# Patient Record
Sex: FEMALE | Race: OTHER | NOT HISPANIC OR LATINO | URBAN - METROPOLITAN AREA
[De-identification: names, ages, dates, MRNs, and addresses within clinical notes are randomized per-mention and may not be internally consistent; named-entity substitution may affect disease eponyms.]

---

## 2023-03-14 ENCOUNTER — EMERGENCY (EMERGENCY)
Facility: HOSPITAL | Age: 88
LOS: 0 days | Discharge: HOME | End: 2023-03-14
Attending: EMERGENCY MEDICINE
Payer: MEDICARE

## 2023-03-14 VITALS
WEIGHT: 134.92 LBS | HEART RATE: 105 BPM | OXYGEN SATURATION: 95 % | HEIGHT: 60 IN | DIASTOLIC BLOOD PRESSURE: 70 MMHG | TEMPERATURE: 95 F | SYSTOLIC BLOOD PRESSURE: 132 MMHG | RESPIRATION RATE: 18 BRPM

## 2023-03-14 VITALS
HEART RATE: 92 BPM | OXYGEN SATURATION: 96 % | RESPIRATION RATE: 18 BRPM | SYSTOLIC BLOOD PRESSURE: 127 MMHG | TEMPERATURE: 97 F | DIASTOLIC BLOOD PRESSURE: 72 MMHG

## 2023-03-14 DIAGNOSIS — T69.9XXA EFFECT OF REDUCED TEMPERATURE, UNSPECIFIED, INITIAL ENCOUNTER: ICD-10-CM
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
## 2023-03-14 DIAGNOSIS — F03.90 UNSPECIFIED DEMENTIA, UNSPECIFIED SEVERITY, WITHOUT BEHAVIORAL DISTURBANCE, PSYCHOTIC DISTURBANCE, MOOD DISTURBANCE, AND ANXIETY: ICD-10-CM

## 2023-03-14 DIAGNOSIS — E78.5 HYPERLIPIDEMIA, UNSPECIFIED: ICD-10-CM

## 2023-03-14 DIAGNOSIS — N39.0 URINARY TRACT INFECTION, SITE NOT SPECIFIED: ICD-10-CM

## 2023-03-14 LAB
ALBUMIN SERPL ELPH-MCNC: 4.8 G/DL — SIGNIFICANT CHANGE UP (ref 3.5–5.2)
ALP SERPL-CCNC: 73 U/L — SIGNIFICANT CHANGE UP (ref 30–115)
ALT FLD-CCNC: 16 U/L — SIGNIFICANT CHANGE UP (ref 0–41)
ANION GAP SERPL CALC-SCNC: 14 MMOL/L — SIGNIFICANT CHANGE UP (ref 7–14)
APPEARANCE UR: ABNORMAL
APTT BLD: 34.2 SEC — SIGNIFICANT CHANGE UP (ref 27–39.2)
AST SERPL-CCNC: 26 U/L — SIGNIFICANT CHANGE UP (ref 0–41)
BACTERIA # UR AUTO: ABNORMAL /HPF
BASOPHILS # BLD AUTO: 0.05 K/UL — SIGNIFICANT CHANGE UP (ref 0–0.2)
BASOPHILS NFR BLD AUTO: 0.4 % — SIGNIFICANT CHANGE UP (ref 0–1)
BILIRUB SERPL-MCNC: 0.4 MG/DL — SIGNIFICANT CHANGE UP (ref 0.2–1.2)
BILIRUB UR-MCNC: NEGATIVE — SIGNIFICANT CHANGE UP
BUN SERPL-MCNC: 15 MG/DL — SIGNIFICANT CHANGE UP (ref 10–20)
CALCIUM SERPL-MCNC: 9.9 MG/DL — SIGNIFICANT CHANGE UP (ref 8.4–10.5)
CHLORIDE SERPL-SCNC: 98 MMOL/L — SIGNIFICANT CHANGE UP (ref 98–110)
CK SERPL-CCNC: 145 U/L — SIGNIFICANT CHANGE UP (ref 0–225)
CO2 SERPL-SCNC: 24 MMOL/L — SIGNIFICANT CHANGE UP (ref 17–32)
COLOR SPEC: YELLOW — SIGNIFICANT CHANGE UP
CREAT SERPL-MCNC: 0.9 MG/DL — SIGNIFICANT CHANGE UP (ref 0.7–1.5)
DIFF PNL FLD: NEGATIVE — SIGNIFICANT CHANGE UP
EGFR: 59 ML/MIN/1.73M2 — LOW
EOSINOPHIL # BLD AUTO: 0 K/UL — SIGNIFICANT CHANGE UP (ref 0–0.7)
EOSINOPHIL NFR BLD AUTO: 0 % — SIGNIFICANT CHANGE UP (ref 0–8)
EPI CELLS # UR: ABNORMAL /HPF
GLUCOSE SERPL-MCNC: 133 MG/DL — HIGH (ref 70–99)
GLUCOSE UR QL: NEGATIVE MG/DL — SIGNIFICANT CHANGE UP
HCT VFR BLD CALC: 42.6 % — SIGNIFICANT CHANGE UP (ref 37–47)
HGB BLD-MCNC: 13.9 G/DL — SIGNIFICANT CHANGE UP (ref 12–16)
IMM GRANULOCYTES NFR BLD AUTO: 0.4 % — HIGH (ref 0.1–0.3)
INR BLD: 1.02 RATIO — SIGNIFICANT CHANGE UP (ref 0.65–1.3)
KETONES UR-MCNC: ABNORMAL
LEUKOCYTE ESTERASE UR-ACNC: ABNORMAL
LYMPHOCYTES # BLD AUTO: 1.19 K/UL — LOW (ref 1.2–3.4)
LYMPHOCYTES # BLD AUTO: 8.9 % — LOW (ref 20.5–51.1)
MAGNESIUM SERPL-MCNC: 2 MG/DL — SIGNIFICANT CHANGE UP (ref 1.8–2.4)
MCHC RBC-ENTMCNC: 29.3 PG — SIGNIFICANT CHANGE UP (ref 27–31)
MCHC RBC-ENTMCNC: 32.6 G/DL — SIGNIFICANT CHANGE UP (ref 32–37)
MCV RBC AUTO: 89.7 FL — SIGNIFICANT CHANGE UP (ref 81–99)
MONOCYTES # BLD AUTO: 0.55 K/UL — SIGNIFICANT CHANGE UP (ref 0.1–0.6)
MONOCYTES NFR BLD AUTO: 4.1 % — SIGNIFICANT CHANGE UP (ref 1.7–9.3)
NEUTROPHILS # BLD AUTO: 11.51 K/UL — HIGH (ref 1.4–6.5)
NEUTROPHILS NFR BLD AUTO: 86.2 % — HIGH (ref 42.2–75.2)
NITRITE UR-MCNC: NEGATIVE — SIGNIFICANT CHANGE UP
NRBC # BLD: 0 /100 WBCS — SIGNIFICANT CHANGE UP (ref 0–0)
PH UR: 6.5 — SIGNIFICANT CHANGE UP (ref 5–8)
PLATELET # BLD AUTO: 285 K/UL — SIGNIFICANT CHANGE UP (ref 130–400)
POTASSIUM SERPL-MCNC: 4.1 MMOL/L — SIGNIFICANT CHANGE UP (ref 3.5–5)
POTASSIUM SERPL-SCNC: 4.1 MMOL/L — SIGNIFICANT CHANGE UP (ref 3.5–5)
PROT SERPL-MCNC: 7.7 G/DL — SIGNIFICANT CHANGE UP (ref 6–8)
PROT UR-MCNC: 30 MG/DL
PROTHROM AB SERPL-ACNC: 11.6 SEC — SIGNIFICANT CHANGE UP (ref 9.95–12.87)
RBC # BLD: 4.75 M/UL — SIGNIFICANT CHANGE UP (ref 4.2–5.4)
RBC # FLD: 12.5 % — SIGNIFICANT CHANGE UP (ref 11.5–14.5)
RBC CASTS # UR COMP ASSIST: ABNORMAL /HPF
SODIUM SERPL-SCNC: 136 MMOL/L — SIGNIFICANT CHANGE UP (ref 135–146)
SP GR SPEC: 1.02 — SIGNIFICANT CHANGE UP (ref 1.01–1.03)
UROBILINOGEN FLD QL: 0.2 MG/DL — SIGNIFICANT CHANGE UP
WBC # BLD: 13.36 K/UL — HIGH (ref 4.8–10.8)
WBC # FLD AUTO: 13.36 K/UL — HIGH (ref 4.8–10.8)
WBC UR QL: ABNORMAL /HPF

## 2023-03-14 PROCEDURE — 85025 COMPLETE CBC W/AUTO DIFF WBC: CPT

## 2023-03-14 PROCEDURE — 99284 EMERGENCY DEPT VISIT MOD MDM: CPT

## 2023-03-14 PROCEDURE — 96374 THER/PROPH/DIAG INJ IV PUSH: CPT

## 2023-03-14 PROCEDURE — 85730 THROMBOPLASTIN TIME PARTIAL: CPT

## 2023-03-14 PROCEDURE — 81001 URINALYSIS AUTO W/SCOPE: CPT

## 2023-03-14 PROCEDURE — 80053 COMPREHEN METABOLIC PANEL: CPT

## 2023-03-14 PROCEDURE — 85610 PROTHROMBIN TIME: CPT

## 2023-03-14 PROCEDURE — 82550 ASSAY OF CK (CPK): CPT

## 2023-03-14 PROCEDURE — 99284 EMERGENCY DEPT VISIT MOD MDM: CPT | Mod: 25

## 2023-03-14 PROCEDURE — 83735 ASSAY OF MAGNESIUM: CPT

## 2023-03-14 RX ORDER — SODIUM CHLORIDE 9 MG/ML
1000 INJECTION INTRAMUSCULAR; INTRAVENOUS; SUBCUTANEOUS ONCE
Refills: 0 | Status: COMPLETED | OUTPATIENT
Start: 2023-03-14 | End: 2023-03-14

## 2023-03-14 RX ORDER — CEFTRIAXONE 500 MG/1
1000 INJECTION, POWDER, FOR SOLUTION INTRAMUSCULAR; INTRAVENOUS ONCE
Refills: 0 | Status: COMPLETED | OUTPATIENT
Start: 2023-03-14 | End: 2023-03-14

## 2023-03-14 RX ORDER — CEFPODOXIME PROXETIL 100 MG
1 TABLET ORAL
Qty: 14 | Refills: 0
Start: 2023-03-14 | End: 2023-03-20

## 2023-03-14 RX ADMIN — CEFTRIAXONE 100 MILLIGRAM(S): 500 INJECTION, POWDER, FOR SOLUTION INTRAMUSCULAR; INTRAVENOUS at 11:31

## 2023-03-14 RX ADMIN — SODIUM CHLORIDE 1000 MILLILITER(S): 9 INJECTION INTRAMUSCULAR; INTRAVENOUS; SUBCUTANEOUS at 09:36

## 2023-03-14 NOTE — ED PROVIDER NOTE - PHYSICAL EXAMINATION
CONSTITUTIONAL: NAD  SKIN: cold dry, normal skin turgor  HEAD: NCAT  EYES: EOMI, PERRLA, no scleral icterus  ENT: normal pharynx with no erythema or exudates  NECK: Supple; non tender. Full ROM.  CARD: RRR, no murmurs.  RESP: clear to ausculation b/l. No crackles or wheezing.  ABD: soft, non-tender, non-distended, no rebound or guarding.  EXT: Full ROM, no bony tenderness, no pedal edema, no calf tenderness  NEURO: moving all extremities; AAOX1 (baseline)   PSYCH: Cooperative, appropriate.

## 2023-03-14 NOTE — ED PROVIDER NOTE - OBJECTIVE STATEMENT
89 yo F with PMH of dementia baseline AAOX 0-1 at baseline, HLD, TIA BIBEMS after being found outside. Patient lives with son and daughter, left the house around 1 AM and found 1/8 mile from house walking around 7 hours later. Patient does not remember leaving the house. Children say she's never done this before. Patient denies any complaints including headache, vision changes, dizziness, CP, SOB, NVD, dysuria, fever.

## 2023-03-14 NOTE — ED PROVIDER NOTE - NSFOLLOWUPINSTRUCTIONS_ED_ALL_ED_FT
Acute Hypothermia    WHAT YOU NEED TO KNOW:    Hypothermia is a condition that develops when body temperature drops below 95°F (35°C). Acute means the condition starts suddenly, gets worse quickly, and lasts a short time. Hypothermia can happen if your body loses too much heat or cannot keep a constant temperature. Hypothermia is classified according to temperature. Mild is 90-95°F (32.2-35°C). Moderate is 82.4-89.9°F (28-32.1°C). Severe is below 82.4°F (28°C).     DISCHARGE INSTRUCTIONS:    Follow up with your healthcare provider as directed: You may need blood tests or other tests to monitor your condition. Write down your questions so you remember to ask them during your visits.     Prevent hypothermia:     Dress in layers. Wear gloves, a warm hat, and thick socks in cold weather. Wear socks and a warm cap when you sleep. Keep an emergency bag with a dry, insulating blanket in your car in case you get lost or injured.      Do not drink alcohol when you are outside in cold weather.       Try to keep your home heated above 64.4°F (18°C). A hot drink at bedtime, hot water bottle, or electric blanket can help keep you warm while you sleep.       Get up and move at least once an hour.       Ask family, friends, or neighbors to check on you in cold weather. Ask your healthcare provider about services that can help if you need shelter, warm clothing or food, or heating assistance.    For support and more information:     [facility]  [facility] [place] [address]  Phone: [phone]  Web Address: [email or web address]      Contact your healthcare provider if:     You are shivering, breathing fast, or your heart is beating faster than usual.      You feel clumsy or confused.       Your hands and feet become pale.       You have questions or concerns about your condition or care.    Return to the emergency department if:     You are breathing more slowly than usual, or your heartbeat is slow and out of rhythm.       Your skin becomes swollen and blue or gray.       Your muscles feel tight and are hard to move.       Urinary Tract Infection    A urinary tract infection (UTI) is an infection of any part of the urinary tract, which includes the kidneys, ureters, bladder, and urethra. Risk factors include ignoring your need to urinate, wiping back to front if female, being an uncircumcised male, and having diabetes or a weak immune system. Symptoms include frequent urination, pain or burning with urination, foul smelling urine, cloudy urine, pain in the lower abdomen, blood in the urine, and fever. If you were prescribed an antibiotic medicine, take it as told by your health care provider. Do not stop taking the antibiotic even if you start to feel better.    SEEK IMMEDIATE MEDICAL CARE IF YOU HAVE ANY OF THE FOLLOWING SYMPTOMS: severe back or abdominal pain, fever, inability to keep fluids or medicine down, dizziness/lightheadedness, or a change in mental status.

## 2023-03-14 NOTE — ED PROVIDER NOTE - PATIENT PORTAL LINK FT
You can access the FollowMyHealth Patient Portal offered by [facility] by registering at the following website: [email or web address]. By joining [employer]’s FollowMyHealth portal, you will also be able to view your health information using other applications (apps) compatible with our system.

## 2023-03-14 NOTE — ED ADULT TRIAGE NOTE - CHIEF COMPLAINT QUOTE
Patient brought in by [facility]. Patient with history of dementia found wandering in street. Patient's daughter states she was seen on a neighborhood camera at 1am. Patient's rectal temp on triage 94.7

## 2023-03-14 NOTE — ED PROVIDER NOTE - CARE PLAN
1 Principal Discharge DX:	Endogenous hypothermia  Secondary Diagnosis:	Acute UTI   Principal Discharge DX:	Exposure to environmental cold, initial encounter  Secondary Diagnosis:	Acute UTI

## 2023-03-14 NOTE — ED PROVIDER NOTE - CLINICAL SUMMARY MEDICAL DECISION MAKING FREE TEXT BOX
89-year-old female relatively healthy other than dementia brought in by EMS after found wandering (outside temperature low 30s), is at her mental status baseline and has no complaints, on exam vitals appreciated, alert and oriented x1 and recognizes daughter ("good" for her), head NC/AT, PERRLA, conjunctive a pink, dry mucous membranes, neck supple, cor regular, lungs CTA, abdomen soft nontender, no CVA tenderness, neurologically nonfocal, patient actively rewarmed with Reginaldo hugger, has a UTI, hypothermia likely from exposure and not sepsis, discussion with family regarding disposition, will discharge home with them on antibiotics.  Family counseled regarding conditions which should prompt return.

## 2023-03-14 NOTE — ED ADULT NURSE NOTE - CHIEF COMPLAINT QUOTE
Patient brought in by [facility]. Patient with history of dementia found wandering in street. Patient's daughter states she was seen on a neighborhood camera at 1am. Patient's rectal temp on triage 94.7

## 2023-06-05 ENCOUNTER — INPATIENT (INPATIENT)
Facility: HOSPITAL | Age: 88
LOS: 7 days | Discharge: NURSING, REHAB OR LONG-TERM CARE | DRG: 312 | End: 2023-06-13
Attending: INTERNAL MEDICINE | Admitting: INTERNAL MEDICINE
Payer: MEDICARE

## 2023-06-05 VITALS
SYSTOLIC BLOOD PRESSURE: 135 MMHG | DIASTOLIC BLOOD PRESSURE: 72 MMHG | OXYGEN SATURATION: 96 % | RESPIRATION RATE: 16 BRPM | TEMPERATURE: 99 F | WEIGHT: 134.92 LBS | HEART RATE: 79 BPM

## 2023-06-05 DIAGNOSIS — R55 SYNCOPE AND COLLAPSE: ICD-10-CM

## 2023-06-05 LAB
ALBUMIN SERPL ELPH-MCNC: 4.4 G/DL — SIGNIFICANT CHANGE UP (ref 3.5–5.2)
ALP SERPL-CCNC: 69 U/L — SIGNIFICANT CHANGE UP (ref 30–115)
ALT FLD-CCNC: 15 U/L — SIGNIFICANT CHANGE UP (ref 0–41)
ANION GAP SERPL CALC-SCNC: 14 MMOL/L — SIGNIFICANT CHANGE UP (ref 7–14)
AST SERPL-CCNC: 24 U/L — SIGNIFICANT CHANGE UP (ref 0–41)
BASOPHILS # BLD AUTO: 0.05 K/UL — SIGNIFICANT CHANGE UP (ref 0–0.2)
BASOPHILS NFR BLD AUTO: 0.7 % — SIGNIFICANT CHANGE UP (ref 0–1)
BILIRUB SERPL-MCNC: 0.2 MG/DL — SIGNIFICANT CHANGE UP (ref 0.2–1.2)
BUN SERPL-MCNC: 9 MG/DL — LOW (ref 10–20)
CALCIUM SERPL-MCNC: 9.4 MG/DL — SIGNIFICANT CHANGE UP (ref 8.4–10.5)
CHLORIDE SERPL-SCNC: 98 MMOL/L — SIGNIFICANT CHANGE UP (ref 98–110)
CO2 SERPL-SCNC: 25 MMOL/L — SIGNIFICANT CHANGE UP (ref 17–32)
CREAT SERPL-MCNC: 0.7 MG/DL — SIGNIFICANT CHANGE UP (ref 0.7–1.5)
EGFR: 80 ML/MIN/1.73M2 — SIGNIFICANT CHANGE UP
EOSINOPHIL # BLD AUTO: 0.17 K/UL — SIGNIFICANT CHANGE UP (ref 0–0.7)
EOSINOPHIL NFR BLD AUTO: 2.3 % — SIGNIFICANT CHANGE UP (ref 0–8)
GLUCOSE SERPL-MCNC: 96 MG/DL — SIGNIFICANT CHANGE UP (ref 70–99)
HCT VFR BLD CALC: 40.5 % — SIGNIFICANT CHANGE UP (ref 37–47)
HGB BLD-MCNC: 13.4 G/DL — SIGNIFICANT CHANGE UP (ref 12–16)
IMM GRANULOCYTES NFR BLD AUTO: 0.1 % — SIGNIFICANT CHANGE UP (ref 0.1–0.3)
LYMPHOCYTES # BLD AUTO: 2.56 K/UL — SIGNIFICANT CHANGE UP (ref 1.2–3.4)
LYMPHOCYTES # BLD AUTO: 34.2 % — SIGNIFICANT CHANGE UP (ref 20.5–51.1)
MAGNESIUM SERPL-MCNC: 2 MG/DL — SIGNIFICANT CHANGE UP (ref 1.8–2.4)
MCHC RBC-ENTMCNC: 29.7 PG — SIGNIFICANT CHANGE UP (ref 27–31)
MCHC RBC-ENTMCNC: 33.1 G/DL — SIGNIFICANT CHANGE UP (ref 32–37)
MCV RBC AUTO: 89.8 FL — SIGNIFICANT CHANGE UP (ref 81–99)
MONOCYTES # BLD AUTO: 0.72 K/UL — HIGH (ref 0.1–0.6)
MONOCYTES NFR BLD AUTO: 9.6 % — HIGH (ref 1.7–9.3)
NEUTROPHILS # BLD AUTO: 3.98 K/UL — SIGNIFICANT CHANGE UP (ref 1.4–6.5)
NEUTROPHILS NFR BLD AUTO: 53.1 % — SIGNIFICANT CHANGE UP (ref 42.2–75.2)
NRBC # BLD: 0 /100 WBCS — SIGNIFICANT CHANGE UP (ref 0–0)
NT-PROBNP SERPL-SCNC: 55 PG/ML — SIGNIFICANT CHANGE UP (ref 0–300)
PLATELET # BLD AUTO: 250 K/UL — SIGNIFICANT CHANGE UP (ref 130–400)
PMV BLD: 10.8 FL — HIGH (ref 7.4–10.4)
POTASSIUM SERPL-MCNC: 4.1 MMOL/L — SIGNIFICANT CHANGE UP (ref 3.5–5)
POTASSIUM SERPL-SCNC: 4.1 MMOL/L — SIGNIFICANT CHANGE UP (ref 3.5–5)
PROT SERPL-MCNC: 6.6 G/DL — SIGNIFICANT CHANGE UP (ref 6–8)
RBC # BLD: 4.51 M/UL — SIGNIFICANT CHANGE UP (ref 4.2–5.4)
RBC # FLD: 13.1 % — SIGNIFICANT CHANGE UP (ref 11.5–14.5)
SODIUM SERPL-SCNC: 137 MMOL/L — SIGNIFICANT CHANGE UP (ref 135–146)
TROPONIN T SERPL-MCNC: <0.01 NG/ML — SIGNIFICANT CHANGE UP
WBC # BLD: 7.49 K/UL — SIGNIFICANT CHANGE UP (ref 4.8–10.8)
WBC # FLD AUTO: 7.49 K/UL — SIGNIFICANT CHANGE UP (ref 4.8–10.8)

## 2023-06-05 PROCEDURE — 71045 X-RAY EXAM CHEST 1 VIEW: CPT | Mod: 26

## 2023-06-05 PROCEDURE — 82550 ASSAY OF CK (CPK): CPT

## 2023-06-05 PROCEDURE — 82607 VITAMIN B-12: CPT

## 2023-06-05 PROCEDURE — 36415 COLL VENOUS BLD VENIPUNCTURE: CPT

## 2023-06-05 PROCEDURE — 73502 X-RAY EXAM HIP UNI 2-3 VIEWS: CPT | Mod: LT

## 2023-06-05 PROCEDURE — 85025 COMPLETE CBC W/AUTO DIFF WBC: CPT

## 2023-06-05 PROCEDURE — 97116 GAIT TRAINING THERAPY: CPT | Mod: GP

## 2023-06-05 PROCEDURE — 80061 LIPID PANEL: CPT

## 2023-06-05 PROCEDURE — 99222 1ST HOSP IP/OBS MODERATE 55: CPT

## 2023-06-05 PROCEDURE — 93010 ELECTROCARDIOGRAM REPORT: CPT

## 2023-06-05 PROCEDURE — 84443 ASSAY THYROID STIM HORMONE: CPT

## 2023-06-05 PROCEDURE — 80053 COMPREHEN METABOLIC PANEL: CPT

## 2023-06-05 PROCEDURE — 87086 URINE CULTURE/COLONY COUNT: CPT

## 2023-06-05 PROCEDURE — 97162 PT EVAL MOD COMPLEX 30 MIN: CPT | Mod: GP

## 2023-06-05 PROCEDURE — 83735 ASSAY OF MAGNESIUM: CPT

## 2023-06-05 PROCEDURE — 87635 SARS-COV-2 COVID-19 AMP PRB: CPT

## 2023-06-05 PROCEDURE — 95819 EEG AWAKE AND ASLEEP: CPT

## 2023-06-05 PROCEDURE — 81001 URINALYSIS AUTO W/SCOPE: CPT

## 2023-06-05 PROCEDURE — 97110 THERAPEUTIC EXERCISES: CPT | Mod: GP

## 2023-06-05 PROCEDURE — 99285 EMERGENCY DEPT VISIT HI MDM: CPT

## 2023-06-05 PROCEDURE — 83036 HEMOGLOBIN GLYCOSYLATED A1C: CPT

## 2023-06-05 PROCEDURE — 82962 GLUCOSE BLOOD TEST: CPT

## 2023-06-05 PROCEDURE — 82746 ASSAY OF FOLIC ACID SERUM: CPT

## 2023-06-05 RX ORDER — ESCITALOPRAM OXALATE 10 MG/1
1 TABLET, FILM COATED ORAL
Refills: 0 | DISCHARGE

## 2023-06-05 RX ORDER — ALPRAZOLAM 0.25 MG
1 TABLET ORAL DAILY
Refills: 0 | Status: DISCONTINUED | OUTPATIENT
Start: 2023-06-05 | End: 2023-06-10

## 2023-06-05 RX ORDER — ONDANSETRON 8 MG/1
4 TABLET, FILM COATED ORAL EVERY 8 HOURS
Refills: 0 | Status: DISCONTINUED | OUTPATIENT
Start: 2023-06-05 | End: 2023-06-11

## 2023-06-05 RX ORDER — ACETAMINOPHEN 500 MG
650 TABLET ORAL EVERY 6 HOURS
Refills: 0 | Status: DISCONTINUED | OUTPATIENT
Start: 2023-06-05 | End: 2023-06-13

## 2023-06-05 RX ORDER — ENOXAPARIN SODIUM 100 MG/ML
40 INJECTION SUBCUTANEOUS EVERY 24 HOURS
Refills: 0 | Status: DISCONTINUED | OUTPATIENT
Start: 2023-06-05 | End: 2023-06-13

## 2023-06-05 RX ORDER — ESCITALOPRAM OXALATE 10 MG/1
5 TABLET, FILM COATED ORAL DAILY
Refills: 0 | Status: DISCONTINUED | OUTPATIENT
Start: 2023-06-05 | End: 2023-06-13

## 2023-06-05 NOTE — H&P ADULT - ASSESSMENT
# Brief episodes of LOC  # placement 89 years old F with PMHx of eczema and dementia presents to ED with daughter who is the HCP  reports that she brings pt today for NH placement as it has become too difficult to care for pt at home. She also reports pt has been having brief episodes of LOC over the last few weeks that seemed to be precipitated by agitation. The episodes are described as a slow passing out episodes and usually she recovers after 5-10 minutes and feels better afterwards. Pt is AAO x 1 at baseline      # Brief episodes of LOC rule out cardiac vs orthostatic vs Seizure vs vasovagal   - Continue tele monitoring  - check orthostatic VS  - Check UA  - F/u TSH, Echo   - fall & aspiration precautions  - Unlike seizure will do an EEG and consider neuro if positive  - Check CPK    # Placement  - Family intereseted in [facility]    # Agitation  - c/w lexapro and xanax    #Misc  - DVT Prophylaxis:  Lovenox S/C  - GI Prophylaxis:  Not needed  - Diet: Regular   - Dispo: Tele/ placement  - Code status: DNR DNI   89 years old F with PMHx of eczema and dementia presents to ED with daughter who is the HCP  reports that she brings pt today for NH placement as it has become too difficult to care for pt at home. She also reports pt has been having brief episodes of LOC over the last few weeks that seemed to be precipitated by agitation. The episodes are described as a slow passing out episodes and usually she recovers after 5-10 minutes and feels better afterwards. Pt is AAO x 1 at baseline      # Brief episodes of LOC rule out cardiac vs orthostatic vs Seizure vs vasovagal   - Continue tele monitoring  - check orthostatic VS  - Check UA  - F/u TSH   - fall & aspiration precautions  - Unlike seizure will do an EEG and consider neuro if positive  - Check CPK  - Check Vit B12 and folate    # Placement  - Family intereseted in [facility]  - CM cs    # Agitation  - c/w lexapro and xanax    #Misc  - DVT Prophylaxis:  Lovenox S/C  - GI Prophylaxis:  Not needed  - Diet: Regular   - Dispo: Tele/ placement  - Code status: DNR DNI   89 years old F with PMHx of eczema and dementia presents to ED with daughter who is the HCP  reports that she brings pt today for NH placement as it has become too difficult to care for pt at home. She also reports pt has been having brief episodes of LOC over the last few weeks that seemed to be precipitated by agitation. The episodes are described as brief spells of unresponsiveness without seizure like activities and usually she recovers after 5-10 minutes and feels better afterwards. Pt is AAO x 1 at baseline    # Brief episodes of LOC rule out cardiac vs orthostatic vs Seizure vs vasovagal   - Continue tele monitoring  - check orthostatic VS  - Check UA  - F/u TSH   - fall & aspiration precautions  - Unlike seizure will do an EEG and consider neuro if positive  - Check CPK  - Check Vit B12 and folate    # Placement  - Family intereseted in [facility]  - CM cs    # Agitation  - c/w lexapro and xanax    #Misc  - DVT Prophylaxis:  Lovenox S/C  - GI Prophylaxis:  Not needed  - Diet: Regular   - Dispo: Tele/ placement  - Code status: DNR DNI

## 2023-06-05 NOTE — H&P ADULT - NSHPPHYSICALEXAM_GEN_ALL_CORE
GENERAL: NAD, lying in bed comfortably  NECK: Supple  CHEST/LUNG: Clear to auscultation bilaterally; No wheezing/crackles  HEART: Regular rate and rhythm; No murmurs  ABDOMEN: Bowel sounds present; Soft, Nontender, Nondistended  EXTREMITIES:  + Peripheral Pulses, no edema  NERVOUS SYSTEM:  Alert & Oriented X3, no new focal deficits  SKIN: No rashes or lesions GENERAL: NAD, lying in bed comfortably  NECK: Supple  CHEST/LUNG: Clear to auscultation bilaterally; No wheezing/crackles  HEART: Regular rate and rhythm; No murmurs  ABDOMEN: Bowel sounds present; Soft, Nontender, Nondistended  EXTREMITIES:  + Peripheral Pulses, no edema  NERVOUS SYSTEM:  Alert & Oriented X0, no new focal deficits  SKIN: No rashes or lesions

## 2023-06-05 NOTE — H&P ADULT - NSHPLABSRESULTS_GEN_ALL_CORE
13.4   7.49  )-----------( 250      ( 05 Jun 2023 16:08 )             40.5       06-05    137  |  98  |  9<L>  ----------------------------<  96  4.1   |  25  |  0.7    Ca    9.4      05 Jun 2023 16:08  Mg     2.0     06-05    TPro  6.6  /  Alb  4.4  /  TBili  0.2  /  DBili  x   /  AST  24  /  ALT  15  /  AlkPhos  69  06-05                      Lactate Trend      CARDIAC MARKERS ( 05 Jun 2023 16:08 )  x     / <0.01 ng/mL / x     / x     / x            CAPILLARY BLOOD GLUCOSE

## 2023-06-05 NOTE — ED PROVIDER NOTE - OBJECTIVE STATEMENT
pt presents to ED by daughter who provides h/o 2/2 pts baseline dementia. she is her HCP. reports she brings pt today for NH placement as it has become too difficult to care for pt at home. she also reports pt has been having brief episodes of LOC over the last few weeks that seemed to be precipitated by agitation. otherwise, pt is at baseline without any other concerns from family.

## 2023-06-05 NOTE — H&P ADULT - HISTORY OF PRESENT ILLNESS
89 years old F presents to ED by daughter who provides h/o 2/2 pts baseline dementia. she is her HCP. reports she brings pt today for NH placement as it has become too difficult to care for pt at home. she also reports pt has been having brief episodes of LOC over the last few weeks that seemed to be precipitated by agitation    In the ED pts VS were T(C): 36.7  T(F): 98  HR: 85  BP: 150/75  RR: 16  SpO2: 97%     89 years old F with PMHx of eczema and dementia presents to ED with daughter who is the HCP  reports that she brings pt today for NH placement as it has become too difficult to care for pt at home. She also reports pt has been having brief episodes of LOC over the last few weeks that seemed to be precipitated by agitation. The episodes are described as a slow passing out episodes and usually she recovers after 5-10 minutes and feels better afterwards. Pt is AAO x 1 at baseline    In the ED pts VS were T(C): 36.7  T(F): 98  HR: 85  BP: 150/75  RR: 16  SpO2: 97%    Pt is admitted for possible placement. family is interested in [facility]     89 years old F with PMHx of eczema and dementia presents to ED with daughter who is the HCP  reports that she brings pt today for NH placement as it has become too difficult to care for pt at home. She also reports pt has been having brief episodes of LOC over the last few weeks that seemed to be precipitated by agitation. The episodes are described as brief spells of unresponsiveness without seizure like activities and usually she recovers after 5-10 minutes and feels better afterwards. Pt is AAO x 1 at baseline    In the ED pts VS were T(C): 36.7  T(F): 98  HR: 85  BP: 150/75  RR: 16  SpO2: 97%    Pt is admitted for possible placement. family is interested in [facility]

## 2023-06-05 NOTE — ED PROVIDER NOTE - CLINICAL SUMMARY MEDICAL DECISION MAKING FREE TEXT BOX
This patient presents with symptoms consistent with near syncope vs syncope. Differential diagnosis includes reflexive syncope (vasovagal). Low suspicion for orthostatic syncope given lack of dehydration, no evidence of acute life threatening hemorrhage (stable hgb). Presentation not consistent with seizures given short time course, no postictal state, no seizure activity. Low suspicion for acute neurologic catastrophes to include ICH given lack of trauma, risk factors for bleeding, or stroke given no focal neuro deficits. Low suspicion for vascular catastrophes to include PE, thoracic aortic dissection, AAA rupture. Presentation not consistent with acute life threatening arrhythmia, structural heart disease, electrical conduction abnormalities, or ACS. However, given age, cardiovascular risk factors, history & physical, will workup and admit for further evaluation and management.  .

## 2023-06-05 NOTE — ED PROVIDER NOTE - PHYSICAL EXAMINATION
CONSTITUTIONAL: Well-appearing; well-nourished; in no apparent distress.   NECK: Supple; non-tender; no cervical lymphadenopathy.   CARDIOVASCULAR: Normal S1, S2; no murmurs, rubs, or gallops.   RESPIRATORY: Normal chest excursion with respiration; breath sounds clear and equal bilaterally; no wheezes, rhonchi, or rales.  GI/: Non-distended; non-tender; no palpable organomegaly.   MS: No evidence of trauma or deformity. Normal ROM in all four extremities; non-tender to palpation; distal pulses are normal.   SKIN: ?rash to chest area on L side (one single nonspecific ?ulcerated type lesion) that pts daughter reports is slightly painful to pt, ?zoster; otherwise warm; dry; good turgor  NEURO/PSYCH: A & O x 0; baseling per daughter. mood and manner are appropriate. Grooming and personal hygiene are appropriate.

## 2023-06-05 NOTE — ED PROVIDER NOTE - CARE PLAN
Principal Discharge DX:	Brief loss of consciousness  Assessment and plan of treatment:	lara requesting placemnt  Secondary Diagnosis:	Hospital admission due to social situation   1

## 2023-06-05 NOTE — ED ADULT TRIAGE NOTE - CHIEF COMPLAINT QUOTE
"Slow motion fainting spells when she gets agitated." Last episode this morning. Family is also requesting nursing home placement.

## 2023-06-05 NOTE — ED PROVIDER NOTE - DIFFERENTIAL DIAGNOSIS
I have reviewed and agree with the initial nursing note, except as documented in my note. Differential Diagnosis

## 2023-06-05 NOTE — H&P ADULT - ATTENDING COMMENTS
HPI:  89 years old F with PMHx of eczema and dementia presents to ED with daughter who is the HCP  reports that she brings pt today for NH placement as it has become too difficult to care for pt at home. She also reports pt has been having brief episodes of LOC over the last few weeks that seemed to be precipitated by agitation. The episodes are described as brief spells of unresponsiveness without seizure like activities and usually she recovers after 5-10 minutes and feels better afterwards. Pt is AAO x 1 at baseline    In the ED pts VS were T(C): 36.7  T(F): 98  HR: 85  BP: 150/75  RR: 16  SpO2: 97%    Pt is admitted for possible placement. family is interested in [facility]     (05 Jun 2023 20:35)    REVIEW OF SYSTEMS: see cc/HPI   CONSTITUTIONAL: No weakness, fevers or chills  EYES/ENT: No visual changes;  No vertigo or throat pain   NECK: No pain or stiffness  RESPIRATORY: No cough, wheezing, hemoptysis; No shortness of breath  CARDIOVASCULAR: No chest pain or palpitations  GASTROINTESTINAL: No abdominal or epigastric pain. No nausea, vomiting, or hematemesis; No diarrhea or constipation. No melena or hematochezia.  GENITOURINARY: No dysuria, frequency or hematuria  NEUROLOGICAL: No numbness or weakness  SKIN: No itching, rashes    Physical Exam:   General: WN/WD NAD  Neurology: A&Ox3, nonfocal, follows commands  Eyes: PERRLA/ EOMI  ENT/Neck: Neck supple, trachea midline, No JVD  Respiratory: CTA B/L, No wheezing, rales, rhonchi  CV: Normal rate regular rhythm, S1S2, no murmurs, rubs or gallops  Abdominal: Soft, NT, ND +BS,   Extremities: No edema, + peripheral pulses  Skin: No Rashes, Hematoma, Ecchymosis      A/p HPI:  89 years old F with PMHx of eczema and dementia presents to ED with daughter who is the HCP  reports that she brings pt today for NH placement as it has become too difficult to care for pt at home. She also reports pt has been having brief episodes of LOC over the last few weeks that seemed to be precipitated by agitation. The episodes are described as brief spells of unresponsiveness without seizure like activities and usually she recovers after 5-10 minutes and feels better afterwards. Pt is AAO x 1 at baseline    In the ED pts VS were T(C): 36.7  T(F): 98  HR: 85  BP: 150/75  RR: 16  SpO2: 97%    Pt is admitted for possible placement. family is interested in [facility]     (05 Jun 2023 20:35)    REVIEW OF SYSTEMS: see cc/HPI   CONSTITUTIONAL: No weakness, fevers or chills  EYES/ENT: No visual changes;  No vertigo or throat pain   NECK: No pain or stiffness  RESPIRATORY: No cough, wheezing, hemoptysis; No shortness of breath  CARDIOVASCULAR: No chest pain or palpitations  GASTROINTESTINAL: No abdominal or epigastric pain. No nausea, vomiting, or hematemesis; No diarrhea or constipation. No melena or hematochezia.  GENITOURINARY: No dysuria, frequency or hematuria  NEUROLOGICAL: No numbness or weakness  SKIN: No itching, rashes  ENDO: no hyperglycemia / thyroid d/o   MSK: no fracture / deformity   PSYCHE : No psychosis, (+) agitation at times     Physical Exam:   General: WN/WD NAD  Neurology: A&Ox1, nonfocal, follows commands  Eyes: PERRLA/ EOMI  ENT/Neck: Neck supple, trachea midline, No JVD  Respiratory: CTA B/L, No wheezing, rales, rhonchi  CV: Normal rate regular rhythm, S1S2, no murmurs, rubs or gallops  Abdominal: Soft, NT, ND +BS,   Extremities: No edema, + peripheral pulses  Skin: No Rashes, Hematoma, Ecchymosis      A/p  Recurrent episodes for LOC when agitated - unsure of this represents breath holding w/ vasovagal syncope ( patient wake up alert and No post-ictal period described)  -admit to tele overnight r/o arrhythmia   -check blood, urine culture   -screening TSH, Vit B12, Folate, RPR   -2D echo   -check orthostatic vital     Dementia w/ behavioral disturbance   -c/w Lexapro but avoid oversedation w/ BDZ    Lack of social support   -Case management /      Fall precautions / OOB only w/ assistance     DVT prophylaxis      DNR/DNI    PATIENT SEEN by ATTENDING 6/5/23 ( note revised 6/6)

## 2023-06-05 NOTE — ED PROVIDER NOTE - ATTENDING APP SHARED VISIT CONTRIBUTION OF CARE
89-year-old female history of HLD, dementia, PSH significant for SYMONE, non-smoker, denies daily interventions, lives at home with family, now presents with intermittent episodes of agitation and associated near syncope, the family is finding it difficult to care for the patient at home, denies fever, total loss of consciousness, witnessed seizure activity, clumsiness, difficulty with coordination, focal weakness or neurological deficits, new medication changes, change in caffeine, nicotine or alcohol intake, recent trauma or other associated complaints at present. Old chart reviewed. I have reviewed and agree with the initial nursing note, except as documented in my note.    VSS, awake, alert, non-toxic appearing, PERRL / EOMI, oropharynx clear, mmm, no skin rash or lesions, chest CTAB, non-labored breathing, no w/r/r, +S1/S2, RRR, no m/r/g, abdomen soft, NT, ND, +BS, no peripheral edema or deformities, equal pulses upper and lower extremities, baseline mental status, no obvious focal neuro deficits.

## 2023-06-06 LAB
A1C WITH ESTIMATED AVERAGE GLUCOSE RESULT: 5.8 % — HIGH (ref 4–5.6)
ALBUMIN SERPL ELPH-MCNC: 4 G/DL — SIGNIFICANT CHANGE UP (ref 3.5–5.2)
ALP SERPL-CCNC: 64 U/L — SIGNIFICANT CHANGE UP (ref 30–115)
ALT FLD-CCNC: 13 U/L — SIGNIFICANT CHANGE UP (ref 0–41)
ANION GAP SERPL CALC-SCNC: 13 MMOL/L — SIGNIFICANT CHANGE UP (ref 7–14)
AST SERPL-CCNC: 21 U/L — SIGNIFICANT CHANGE UP (ref 0–41)
BASOPHILS # BLD AUTO: 0.05 K/UL — SIGNIFICANT CHANGE UP (ref 0–0.2)
BASOPHILS NFR BLD AUTO: 0.7 % — SIGNIFICANT CHANGE UP (ref 0–1)
BILIRUB SERPL-MCNC: 0.4 MG/DL — SIGNIFICANT CHANGE UP (ref 0.2–1.2)
BUN SERPL-MCNC: 7 MG/DL — LOW (ref 10–20)
CALCIUM SERPL-MCNC: 9.3 MG/DL — SIGNIFICANT CHANGE UP (ref 8.4–10.4)
CHLORIDE SERPL-SCNC: 102 MMOL/L — SIGNIFICANT CHANGE UP (ref 98–110)
CHOLEST SERPL-MCNC: 266 MG/DL — HIGH
CK SERPL-CCNC: 54 U/L — SIGNIFICANT CHANGE UP (ref 0–225)
CO2 SERPL-SCNC: 22 MMOL/L — SIGNIFICANT CHANGE UP (ref 17–32)
CREAT SERPL-MCNC: 0.6 MG/DL — LOW (ref 0.7–1.5)
EGFR: 83 ML/MIN/1.73M2 — SIGNIFICANT CHANGE UP
EOSINOPHIL # BLD AUTO: 0.22 K/UL — SIGNIFICANT CHANGE UP (ref 0–0.7)
EOSINOPHIL NFR BLD AUTO: 3 % — SIGNIFICANT CHANGE UP (ref 0–8)
ESTIMATED AVERAGE GLUCOSE: 120 MG/DL — HIGH (ref 68–114)
FOLATE SERPL-MCNC: >20 NG/ML — SIGNIFICANT CHANGE UP
GLUCOSE SERPL-MCNC: 97 MG/DL — SIGNIFICANT CHANGE UP (ref 70–99)
HCT VFR BLD CALC: 40 % — SIGNIFICANT CHANGE UP (ref 37–47)
HDLC SERPL-MCNC: 42 MG/DL — LOW
HGB BLD-MCNC: 13 G/DL — SIGNIFICANT CHANGE UP (ref 12–16)
IMM GRANULOCYTES NFR BLD AUTO: 0.3 % — SIGNIFICANT CHANGE UP (ref 0.1–0.3)
LIPID PNL WITH DIRECT LDL SERPL: 175 MG/DL — HIGH
LYMPHOCYTES # BLD AUTO: 2.19 K/UL — SIGNIFICANT CHANGE UP (ref 1.2–3.4)
LYMPHOCYTES # BLD AUTO: 29.4 % — SIGNIFICANT CHANGE UP (ref 20.5–51.1)
MAGNESIUM SERPL-MCNC: 1.8 MG/DL — SIGNIFICANT CHANGE UP (ref 1.8–2.4)
MCHC RBC-ENTMCNC: 28.8 PG — SIGNIFICANT CHANGE UP (ref 27–31)
MCHC RBC-ENTMCNC: 32.5 G/DL — SIGNIFICANT CHANGE UP (ref 32–37)
MCV RBC AUTO: 88.7 FL — SIGNIFICANT CHANGE UP (ref 81–99)
MONOCYTES # BLD AUTO: 0.61 K/UL — HIGH (ref 0.1–0.6)
MONOCYTES NFR BLD AUTO: 8.2 % — SIGNIFICANT CHANGE UP (ref 1.7–9.3)
NEUTROPHILS # BLD AUTO: 4.35 K/UL — SIGNIFICANT CHANGE UP (ref 1.4–6.5)
NEUTROPHILS NFR BLD AUTO: 58.4 % — SIGNIFICANT CHANGE UP (ref 42.2–75.2)
NON HDL CHOLESTEROL: 224 MG/DL — HIGH
NRBC # BLD: 0 /100 WBCS — SIGNIFICANT CHANGE UP (ref 0–0)
PLATELET # BLD AUTO: 228 K/UL — SIGNIFICANT CHANGE UP (ref 130–400)
PMV BLD: 10.7 FL — HIGH (ref 7.4–10.4)
POTASSIUM SERPL-MCNC: 3.7 MMOL/L — SIGNIFICANT CHANGE UP (ref 3.5–5)
POTASSIUM SERPL-SCNC: 3.7 MMOL/L — SIGNIFICANT CHANGE UP (ref 3.5–5)
PROT SERPL-MCNC: 6.2 G/DL — SIGNIFICANT CHANGE UP (ref 6–8)
RBC # BLD: 4.51 M/UL — SIGNIFICANT CHANGE UP (ref 4.2–5.4)
RBC # FLD: 12.9 % — SIGNIFICANT CHANGE UP (ref 11.5–14.5)
SODIUM SERPL-SCNC: 137 MMOL/L — SIGNIFICANT CHANGE UP (ref 135–146)
TRIGL SERPL-MCNC: 243 MG/DL — HIGH
TSH SERPL-MCNC: 3.52 UIU/ML — SIGNIFICANT CHANGE UP (ref 0.27–4.2)
VIT B12 SERPL-MCNC: 675 PG/ML — SIGNIFICANT CHANGE UP (ref 232–1245)
WBC # BLD: 7.44 K/UL — SIGNIFICANT CHANGE UP (ref 4.8–10.8)
WBC # FLD AUTO: 7.44 K/UL — SIGNIFICANT CHANGE UP (ref 4.8–10.8)

## 2023-06-06 PROCEDURE — 99232 SBSQ HOSP IP/OBS MODERATE 35: CPT

## 2023-06-06 RX ADMIN — Medication 1 MILLIGRAM(S): at 11:13

## 2023-06-06 RX ADMIN — ENOXAPARIN SODIUM 40 MILLIGRAM(S): 100 INJECTION SUBCUTANEOUS at 11:15

## 2023-06-06 RX ADMIN — Medication 1 APPLICATION(S): at 18:40

## 2023-06-06 RX ADMIN — ESCITALOPRAM OXALATE 5 MILLIGRAM(S): 10 TABLET, FILM COATED ORAL at 11:13

## 2023-06-06 NOTE — PROGRESS NOTE ADULT - ASSESSMENT
89 years old F with PMHx of eczema and dementia presents to ED with daughter who is the HCP  reports that she brings pt today for NH placement as it has become too difficult to care for pt at home. She also reports pt has been having brief episodes of LOC over the last few weeks that seemed to be precipitated by agitation. The episodes are described as brief spells of unresponsiveness without seizure like activities and usually she recovers after 5-10 minutes and feels better afterwards. Pt is AAO x 1 at baseline    # Brief episodes of LOC   - rule out cardiac vs orthostatic vs Seizure vs vasovagal   - Continue tele monitoring  - check orthostatic VS  - Check UA  - F/u TSH   - fall & aspiration precautions  - Unlike seizure will do an EEG and consider neuro if positive  - Check CPK  - Check Vit B12 and folate    # Placement  - Family intereseted in [facility]  - CM cs    # Agitation  - c/w lexapro and xanax    #Misc  - DVT Prophylaxis:  Lovenox S/C  - GI Prophylaxis:  Not needed  - Diet: Regular   - Dispo: Tele/ placement  - Code status: DNR DNI

## 2023-06-06 NOTE — ED ADULT NURSE REASSESSMENT NOTE - NS ED NURSE REASSESS COMMENT FT1
Pt refusing to leave cardiac leads on chest. I explained the importance of them, however pt has dementia and sun-downing and is not understanding. Leads off for now. Will try in morning to put them on again.

## 2023-06-06 NOTE — PROGRESS NOTE ADULT - SUBJECTIVE AND OBJECTIVE BOX
MARCOS TAYLOR  89y  Female      Patient is a 89y old  Female who presents with a chief complaint of Placement (05 Jun 2023 20:35)      INTERVAL HPI/OVERNIGHT EVENTS:      REVIEW OF SYSTEMS:  CONSTITUTIONAL: No fever, weight loss, or fatigue  RESPIRATORY: No cough, wheezing, chills or hemoptysis; No shortness of breath  CARDIOVASCULAR: No chest pain, palpitations, dizziness, or leg swelling  GASTROINTESTINAL: No abdominal or epigastric pain. No nausea, vomiting, or hematemesis; No diarrhea or constipation. No melena or hematochezia.  GENITOURINARY: No dysuria, frequency, hematuria, or incontinence  NEUROLOGICAL: No headaches, memory loss, loss of strength, numbness, or tremors  SKIN: No itching, burning, rashes, or lesions   MUSCULOSKELETAL: No joint pain or swelling; No muscle, back, or extremity pain  PSYCHIATRIC: No depression, anxiety, mood swings, or difficulty sleeping  All other review of systems negative    T(C): 34.4 (06-06-23 @ 08:09), Max: 37 (06-05-23 @ 13:32)  HR: 76 (06-06-23 @ 08:09) (68 - 85)  BP: 144/65 (06-06-23 @ 08:09) (135/72 - 150/75)  RR: 17 (06-06-23 @ 08:09) (16 - 17)  SpO2: 96% (06-06-23 @ 08:09) (96% - 97%)  Wt(kg): --Vital Signs Last 24 Hrs  T(C): 34.4 (06 Jun 2023 08:09), Max: 37 (05 Jun 2023 13:32)  T(F): 93.9 (06 Jun 2023 08:09), Max: 98.6 (05 Jun 2023 13:32)  HR: 76 (06 Jun 2023 08:09) (68 - 85)  BP: 144/65 (06 Jun 2023 08:09) (135/72 - 150/75)  BP(mean): --  RR: 17 (06 Jun 2023 08:09) (16 - 17)  SpO2: 96% (06 Jun 2023 08:09) (96% - 97%)    Parameters below as of 06 Jun 2023 08:09  Patient On (Oxygen Delivery Method): room air      PHYSICAL EXAM:  GENERAL: NAD, well-groomed, well-developed  PSYCH: no agitation, baseline mentation  NERVOUS SYSTEM:  Alert & Oriented X3, Motor Strength 5/5 B/L upper and lower extremities; Sensory intact; FTN WNL  PULMONARY: Clear to percussion bilaterally; No rales, rhonchi, wheezing, or rubs  CARDIOVASCULAR: Regular rate and rhythm; No murmurs, rubs, or gallops  GI: Soft, Nontender, Nondistended; Bowel sounds present  EXTREMITIES:  2+ Peripheral Pulses, No clubbing, cyanosis, or edema  LYMPH: No lymphadenopathy noted  SKIN: No rashes or lesions    Consultant(s) Notes Reviewed:  [x ] YES  [ ] NO    Discussed with Consultants/Other Providers [ x] YES     LABS                          13.0   7.44  )-----------( 228      ( 06 Jun 2023 05:38 )             40.0     06-06    137  |  102  |  7<L>  ----------------------------<  97  3.7   |  22  |  0.6<L>    Ca    9.3      06 Jun 2023 05:38  Mg     1.8     06-06    TPro  6.2  /  Alb  4.0  /  TBili  0.4  /  DBili  x   /  AST  21  /  ALT  13  /  AlkPhos  64  06-06    CARDIAC MARKERS ( 06 Jun 2023 05:38 )  x     / x     / 54 U/L / x     / x      CARDIAC MARKERS ( 05 Jun 2023 16:08 )  x     / <0.01 ng/mL / x     / x     / x        RADIOLOGY & ADDITIONAL TESTS:  Xray Chest 1 View- PORTABLE-Urgent (06.05.23 @ 16:47) >  Impression:  No radiographic evidence of acute cardiopulmonary disease.        Imaging Personally Reviewed:  [ ] YES  [x ] NO    HEALTH ISSUES - PROBLEM Dx:      MEDICATIONS  (STANDING):  ALPRAZolam 1 milliGRAM(s) Oral daily  clobetasol 0.05% Cream 1 Application(s) Topical every 12 hours  enoxaparin Injectable 40 milliGRAM(s) SubCutaneous every 24 hours  escitalopram 5 milliGRAM(s) Oral daily    MEDICATIONS  (PRN):  acetaminophen     Tablet .. 650 milliGRAM(s) Oral every 6 hours PRN Temp greater or equal to 38C (100.4F), Mild Pain (1 - 3)  ondansetron Injectable 4 milliGRAM(s) IV Push every 8 hours PRN Nausea and/or Vomiting     CLAUDIA MARCOS  89y  Female      Patient is a 89y old  Female who presents with a chief complaint of Placement (05 Jun 2023 20:35)      INTERVAL HPI/OVERNIGHT EVENTS: no acute events overnight. daughter at bedside(not daughter that lives with patient and brought her in). noted that mother has been in decline as of lately and at times does not even recognize her own children "much of the time"      REVIEW OF SYSTEMS:  unable to accurately obtain     T(C): 34.4 (06-06-23 @ 08:09), Max: 37 (06-05-23 @ 13:32)  HR: 76 (06-06-23 @ 08:09) (68 - 85)  BP: 144/65 (06-06-23 @ 08:09) (135/72 - 150/75)  RR: 17 (06-06-23 @ 08:09) (16 - 17)  SpO2: 96% (06-06-23 @ 08:09) (96% - 97%)  Wt(kg): --Vital Signs Last 24 Hrs  T(C): 34.4 (06 Jun 2023 08:09), Max: 37 (05 Jun 2023 13:32)  T(F): 93.9 (06 Jun 2023 08:09), Max: 98.6 (05 Jun 2023 13:32)  HR: 76 (06 Jun 2023 08:09) (68 - 85)  BP: 144/65 (06 Jun 2023 08:09) (135/72 - 150/75)  BP(mean): --  RR: 17 (06 Jun 2023 08:09) (16 - 17)  SpO2: 96% (06 Jun 2023 08:09) (96% - 97%)    Parameters below as of 06 Jun 2023 08:09  Patient On (Oxygen Delivery Method): room air      PHYSICAL EXAM:  GENERAL: frail, elderly F, NAD  PSYCH: no agitation, baseline mentation  NERVOUS SYSTEM:  Alert & Oriented X0  PULMONARY: symmetrical chest rise, no accessory muscle use  CARDIOVASCULAR: non tachycardic  GI: Soft, Nontender, Nondistended; Bowel sounds present  EXTREMITIES:   No clubbing, cyanosis, or edema  SKIN: No rashes or lesions    Consultant(s) Notes Reviewed:  [x ] YES  [ ] NO    Discussed with Consultants/Other Providers [ x] YES     LABS                          13.0   7.44  )-----------( 228      ( 06 Jun 2023 05:38 )             40.0     06-06    137  |  102  |  7<L>  ----------------------------<  97  3.7   |  22  |  0.6<L>    Ca    9.3      06 Jun 2023 05:38  Mg     1.8     06-06    TPro  6.2  /  Alb  4.0  /  TBili  0.4  /  DBili  x   /  AST  21  /  ALT  13  /  AlkPhos  64  06-06    CARDIAC MARKERS ( 06 Jun 2023 05:38 )  x     / x     / 54 U/L / x     / x      CARDIAC MARKERS ( 05 Jun 2023 16:08 )  x     / <0.01 ng/mL / x     / x     / x        RADIOLOGY & ADDITIONAL TESTS:  Xray Chest 1 View- PORTABLE-Urgent (06.05.23 @ 16:47) >  Impression:  No radiographic evidence of acute cardiopulmonary disease.        Imaging Personally Reviewed:  [ ] YES  [x ] NO    HEALTH ISSUES - PROBLEM Dx:      MEDICATIONS  (STANDING):  ALPRAZolam 1 milliGRAM(s) Oral daily  clobetasol 0.05% Cream 1 Application(s) Topical every 12 hours  enoxaparin Injectable 40 milliGRAM(s) SubCutaneous every 24 hours  escitalopram 5 milliGRAM(s) Oral daily    MEDICATIONS  (PRN):  acetaminophen     Tablet .. 650 milliGRAM(s) Oral every 6 hours PRN Temp greater or equal to 38C (100.4F), Mild Pain (1 - 3)  ondansetron Injectable 4 milliGRAM(s) IV Push every 8 hours PRN Nausea and/or Vomiting

## 2023-06-07 DIAGNOSIS — Z78.9 OTHER SPECIFIED HEALTH STATUS: ICD-10-CM

## 2023-06-07 DIAGNOSIS — Z79.899 OTHER LONG TERM (CURRENT) DRUG THERAPY: ICD-10-CM

## 2023-06-07 DIAGNOSIS — L30.9 DERMATITIS, UNSPECIFIED: ICD-10-CM

## 2023-06-07 DIAGNOSIS — R55 SYNCOPE AND COLLAPSE: ICD-10-CM

## 2023-06-07 DIAGNOSIS — F03.90 UNSPECIFIED DEMENTIA, UNSPECIFIED SEVERITY, WITHOUT BEHAVIORAL DISTURBANCE, PSYCHOTIC DISTURBANCE, MOOD DISTURBANCE, AND ANXIETY: ICD-10-CM

## 2023-06-07 LAB
APPEARANCE UR: ABNORMAL
BACTERIA # UR AUTO: ABNORMAL
BILIRUB UR-MCNC: NEGATIVE — SIGNIFICANT CHANGE UP
COLOR SPEC: YELLOW — SIGNIFICANT CHANGE UP
DIFF PNL FLD: NEGATIVE — SIGNIFICANT CHANGE UP
EPI CELLS # UR: 13 /HPF — HIGH (ref 0–5)
GLUCOSE BLDC GLUCOMTR-MCNC: 99 MG/DL — SIGNIFICANT CHANGE UP (ref 70–99)
GLUCOSE UR QL: NEGATIVE — SIGNIFICANT CHANGE UP
HYALINE CASTS # UR AUTO: 16 /LPF — HIGH (ref 0–7)
KETONES UR-MCNC: NEGATIVE — SIGNIFICANT CHANGE UP
LEUKOCYTE ESTERASE UR-ACNC: ABNORMAL
NITRITE UR-MCNC: NEGATIVE — SIGNIFICANT CHANGE UP
PH UR: 6.5 — SIGNIFICANT CHANGE UP (ref 5–8)
PROT UR-MCNC: SIGNIFICANT CHANGE UP
RBC CASTS # UR COMP ASSIST: 3 /HPF — SIGNIFICANT CHANGE UP (ref 0–4)
SP GR SPEC: 1.02 — SIGNIFICANT CHANGE UP (ref 1.01–1.03)
UROBILINOGEN FLD QL: SIGNIFICANT CHANGE UP
WBC UR QL: 75 /HPF — HIGH (ref 0–5)

## 2023-06-07 PROCEDURE — 95816 EEG AWAKE AND DROWSY: CPT | Mod: 26

## 2023-06-07 PROCEDURE — 99233 SBSQ HOSP IP/OBS HIGH 50: CPT

## 2023-06-07 PROCEDURE — 73502 X-RAY EXAM HIP UNI 2-3 VIEWS: CPT | Mod: 26,LT

## 2023-06-07 PROCEDURE — 99497 ADVNCD CARE PLAN 30 MIN: CPT | Mod: 25

## 2023-06-07 RX ORDER — CEFPODOXIME PROXETIL 100 MG
100 TABLET ORAL EVERY 12 HOURS
Refills: 0 | Status: DISCONTINUED | OUTPATIENT
Start: 2023-06-07 | End: 2023-06-07

## 2023-06-07 RX ORDER — CEFPODOXIME PROXETIL 100 MG
200 TABLET ORAL EVERY 12 HOURS
Refills: 0 | Status: COMPLETED | OUTPATIENT
Start: 2023-06-07 | End: 2023-06-10

## 2023-06-07 RX ADMIN — Medication 200 MILLIGRAM(S): at 18:19

## 2023-06-07 RX ADMIN — ENOXAPARIN SODIUM 40 MILLIGRAM(S): 100 INJECTION SUBCUTANEOUS at 11:15

## 2023-06-07 RX ADMIN — ESCITALOPRAM OXALATE 5 MILLIGRAM(S): 10 TABLET, FILM COATED ORAL at 11:15

## 2023-06-07 RX ADMIN — Medication 1 MILLIGRAM(S): at 11:15

## 2023-06-07 RX ADMIN — Medication 1 APPLICATION(S): at 07:02

## 2023-06-07 NOTE — RAPID RESPONSE TEAM SUMMARY - NSSITUATIONBACKGROUNDRRT_GEN_ALL_CORE
Pt admitted for syncopal workup, suspected to be vasovagal.  Today, after using the bathroom the patient stood, started to turn around, and fell down without loss of consciousness. Pt's daughter was present and caught the pt who fell on her right side and hit her head. Pt did not sustain any noticeable injuries from the fall.  On exam, pt is at baseline mental state oriented only to self without any focal neurologic deficits.  BP sys 150's, no events on telemetry, glucose >100    Suspect vasovagal vs mechanical fall.    Plan: order CTH/C-spine to rule out bleed

## 2023-06-07 NOTE — PATIENT PROFILE ADULT - FALL HARM RISK - HARM RISK INTERVENTIONS

## 2023-06-07 NOTE — PROGRESS NOTE ADULT - PROBLEM SELECTOR PLAN 1
recurrent episodes after agitation/ periods of high emotion  possible vaso vagal response  reeg neg  ekg noted  d/w daughter, limited w/u requested  discharge planning to snf/ longterm at [place]

## 2023-06-07 NOTE — PROGRESS NOTE ADULT - NSPROGADDITIONALINFOA_GEN_ALL_CORE
#Progress Note Handoff:  Pending (specify):  Consults_________, Tests________, Test Results_______, Other_____snf placement____  Family discussion: d/w daughter, son at bedside re: treatment plan, primary dx  Disposition: Home___/SNF_x__/Other________/Unknown at this time________

## 2023-06-07 NOTE — PROGRESS NOTE ADULT - SUBJECTIVE AND OBJECTIVE BOX
INTERVAL HPI/OVERNIGHT EVENTS:    SUBJECTIVE: Patient seen and examined at bedside.     cc: syncope  no cp, sob, abd pain, fever  no ha, dizziness, lightheadedness, syncope    OBJECTIVE:    VITAL SIGNS:  Vital Signs Last 24 Hrs  T(C): 36.4 (07 Jun 2023 07:00), Max: 36.4 (07 Jun 2023 07:00)  T(F): 97.6 (07 Jun 2023 07:00), Max: 97.6 (07 Jun 2023 07:00)  HR: 83 (07 Jun 2023 07:00) (83 - 97)  BP: 148/74 (07 Jun 2023 07:00) (107/55 - 148/74)  BP(mean): --  RR: 18 (07 Jun 2023 07:00) (18 - 18)  SpO2: 95% (07 Jun 2023 07:00) (94% - 96%)    Parameters below as of 07 Jun 2023 07:00  Patient On (Oxygen Delivery Method): room air          PHYSICAL EXAM:    General: NAD  HEENT: NC/AT; PERRL, clear conjunctiva  Neck: supple  Respiratory: CTA b/l  Cardiovascular: +S1/S2; RRR  Abdomen: soft, NT/ND; +BS x4  Extremities: WWP, 2+ peripheral pulses b/l; no LE edema  Skin: normal color and turgor; no rash  Neurological:    MEDICATIONS:  MEDICATIONS  (STANDING):  ALPRAZolam 1 milliGRAM(s) Oral daily  clobetasol 0.05% Cream 1 Application(s) Topical every 12 hours  enoxaparin Injectable 40 milliGRAM(s) SubCutaneous every 24 hours  escitalopram 5 milliGRAM(s) Oral daily    MEDICATIONS  (PRN):  acetaminophen     Tablet .. 650 milliGRAM(s) Oral every 6 hours PRN Temp greater or equal to 38C (100.4F), Mild Pain (1 - 3)  ondansetron Injectable 4 milliGRAM(s) IV Push every 8 hours PRN Nausea and/or Vomiting      ALLERGIES:  Allergies    No Known Allergies    Intolerances        LABS:                        13.0   7.44  )-----------( 228      ( 06 Jun 2023 05:38 )             40.0     Hemoglobin: 13.0 g/dL (06-06 @ 05:38)  Hemoglobin: 13.4 g/dL (06-05 @ 16:08)    CBC Full  -  ( 06 Jun 2023 05:38 )  WBC Count : 7.44 K/uL  RBC Count : 4.51 M/uL  Hemoglobin : 13.0 g/dL  Hematocrit : 40.0 %  Platelet Count - Automated : 228 K/uL  Mean Cell Volume : 88.7 fL  Mean Cell Hemoglobin : 28.8 pg  Mean Cell Hemoglobin Concentration : 32.5 g/dL  Auto Neutrophil # : 4.35 K/uL  Auto Lymphocyte # : 2.19 K/uL  Auto Monocyte # : 0.61 K/uL  Auto Eosinophil # : 0.22 K/uL  Auto Basophil # : 0.05 K/uL  Auto Neutrophil % : 58.4 %  Auto Lymphocyte % : 29.4 %  Auto Monocyte % : 8.2 %  Auto Eosinophil % : 3.0 %  Auto Basophil % : 0.7 %    06-06    137  |  102  |  7<L>  ----------------------------<  97  3.7   |  22  |  0.6<L>    Ca    9.3      06 Jun 2023 05:38  Mg     1.8     06-06    TPro  6.2  /  Alb  4.0  /  TBili  0.4  /  DBili  x   /  AST  21  /  ALT  13  /  AlkPhos  64  06-06    Creatinine Trend: 0.6<--, 0.7<--  LIVER FUNCTIONS - ( 06 Jun 2023 05:38 )  Alb: 4.0 g/dL / Pro: 6.2 g/dL / ALK PHOS: 64 U/L / ALT: 13 U/L / AST: 21 U/L / GGT: x               hs Troponin:              CSF:                      EKG:   MICROBIOLOGY:    IMAGING:      Labs, imaging, EKG personally reviewed    RADIOLOGY & ADDITIONAL TESTS: Reviewed.

## 2023-06-07 NOTE — PHYSICAL THERAPY INITIAL EVALUATION ADULT - PERTINENT HX OF CURRENT PROBLEM, REHAB EVAL
89 years old F with PMHx of eczema and dementia presents to ED with daughter who is the HCP  reports that she brings pt today for NH placement as it has become too difficult to care for pt at home. She also reports pt has been having brief episodes of LOC over the last few weeks that seemed to be precipitated by agitation. The episodes are described as brief spells of unresponsiveness without seizure like activities and usually she recovers after 5-10 minutes and feels better afterwards. Pt is AAO x 1 at baseline    # Brief episodes of LOC   - rule out cardiac vs orthostatic vs Seizure vs vasovagal

## 2023-06-07 NOTE — PHYSICAL THERAPY INITIAL EVALUATION ADULT - ASSISTIVE DEVICE, REHAB EVAL
Patient admitted with severe PVD of LLE  No Doppler flow identified within posterior tibial, peroneal, or dorsalis pedis arteries consistent with occlusion per [facility] LLE report  Patient was previously on Coumadin for cardiac thrombus as well as atrial fibrillation.  Coumadin was held and she was placed on heparin drip.  On 3/18 underwent left lower extremity angiogram by Dr. Khoobehi, long segment of calcified PT, small vessel disease, lesions were amendable to intervention due to severe calcification/size of vessel.  Discussed these findings with patient and family member present at bedside on day of discharge.   bed rails

## 2023-06-07 NOTE — PROGRESS NOTE ADULT - PROBLEM/PLAN-4
"  CC: Acute Hypoxic Respiratory Failure.    S: Continues to be short of breath. Bronch today.  Continues to have occasional cough and phlegm production.    O: /81 (BP Location: Left arm, Patient Position: Lying)  Pulse 62  Temp 98.5 °F (36.9 °C) (Axillary)   Resp 20  Ht 147.3 cm (58\")  Wt 93 kg (205 lb 1.6 oz)  LMP  (LMP Unknown)  SpO2 94%  BMI 42.87 kg/m2      General: No significant respiratory distress noted.  Neck: No JVD.   Cardiovascular: S1 + S2.  Regular.   Respiratory: Bilateral scattered wheezing heard.  Left greater than right crackles noted  Abdomen: Soft.  Bowel sounds positive.  No obvious organomegaly.  Extremities: Trace  edema noted.  Neurologic:  AAOx3. Was able to follow commands.     Labs: Reviewed.       Results from last 7 days  Lab Units 01/08/18  0607 01/06/18  0637 01/05/18  0603   SODIUM mmol/L 143 141 145   POTASSIUM mmol/L 4.4 4.6 4.2   CHLORIDE mmol/L 97* 93* 93*   CO2 mmol/L 38.0* 40.0* 42.0*   BUN mg/dL 44* 55* 55*   CREATININE mg/dL 1.20 1.60* 1.80*   CALCIUM mg/dL 9.7 9.2 9.2   GLUCOSE mg/dL 281* 386* 223*       Results from last 7 days  Lab Units 01/08/18  0607 01/05/18  0603   WBC 10*3/mm3 15.71* 12.84*   HEMOGLOBIN g/dL 13.4 13.2   PLATELETS 10*3/mm3 370 341         Results from last 7 days  Lab Units 01/08/18  0607 01/05/18  0603   INR  1.10 1.17*         Pharmacy Consult        CXRay: Left asymmetric opacity.     Assessment & Recommendations/Plan:   1.  Acute hypoxic respiratory failure  Continues to require high flow oxygen    2.  Left upper lobe healthcare associated pneumonia  On appropriate antibiotics  Cultures pending    3.  Left upper lobe atelectasis?  Continue percussion therapy as well as incentive spirometry.    4.  Chronic hypoxemia  Is on 2 L at home.  Currently requiring high flow.    5.  Obstructive sleep apnea.    On BiPAP for many years.  Continue BiPAP    6.  Chronic hypercarbic and hypoxic respiratory failure (last ABG from 1 January showed pH of " 7.41 with a PCO2 55 and PO2 of 62)  Is on BiPAP  We may need repeat ABG on outpatient basis, to assess her response and possible adjustments to the BiPAP.    7.  Grade 2 Diastolic Dysfunction.   Doesn't appear to be decompensated    8. Likely CKD.   Baseline Creatinine seems to be 1.3-1.5.    Lovenox will be restarted after Bronchoscopy.     Based on the results of bronchoscopy and her clinical progression, further recommendations will be made.    I will also order ABG for tomorrow morning    We have reviewed patient's current orders and changes, if any, have been suggested to primary care team. Plan was also discussed with nursing staff, as necessary.       Ryder Dahl MD  01/08/18  9:15 AM    Dictated utilizing Dragon dictation.     DISPLAY PLAN FREE TEXT

## 2023-06-07 NOTE — GOALS OF CARE CONVERSATION - ADVANCED CARE PLANNING - CONVERSATION DETAILS
CODE STATUS: DNR/DNI  NG TUBE: DECLINED     Discussed care with daughter Charmaine Fitzgerald ([phone]). Prior MOLST for DNR/DNI incompletely filled. Reviewed through with family, still maintain DNR/DNI, would prefer no NGT, now interested in CMO at this time as pt stable, if she were to become unstable not interested in aggressive measures.

## 2023-06-07 NOTE — PATIENT PROFILE ADULT - PATIENT REPRESENTATIVE: ( YOU CAN CHOOSE ANY PERSON THAT CAN ASSIST YOU WITH YOUR HEALTH CARE PREFERENCES, DOES NOT HAVE TO BE A SPOUSE, IMMEDIATE FAMILY OR SIGNIFICANT OTHER/PARTNER)
same name as above Imiquimod Counseling:  I discussed with the patient the risks of imiquimod including but not limited to erythema, scaling, itching, weeping, crusting, and pain.  Patient understands that the inflammatory response to imiquimod is variable from person to person and was educated regarded proper titration schedule.  If flu-like symptoms develop, patient knows to discontinue the medication and contact us.

## 2023-06-07 NOTE — PHYSICAL THERAPY INITIAL EVALUATION ADULT - IMPAIRMENTS FOUND, PT EVAL
Primary Care Center Phone Number [phone]  [facility] Medication Refill Request Information:  * Please contact your pharmacy regarding ANY request for medication refills.  ** [facility] Prescription Fax = [phone]  * Please allow 3 business days for routine medication refills.  * Please allow 5 business days for controlled substance medication refills.     [facility] Test Result notification information:  *You will be notified with in 7-10 days of your appointment day regarding the results of your test.  If you are on MyChart you will be notified as soon as the provider has reviewed the results and signed off on them.                 [facility]         Internal Medicine Resident                   Continuity Clinic    Who We Are    Resident Continuity Clinic is a part of the [facility] Primary [facility].  Resident physicians see patients independently and establish a relationship with them over the course of their three-year residency program.  As with the Primary Care Clinic, our Resident Continuity Clinic models a group practice.  If your doctor is not available, you will be able to see another resident physician.  At the end of a resident s training, patients will be transitioned to a new resident physician for ongoing care.     We treat patients with a wide array of medical needs from routine physicals, to acute illnesses, to diabetes and blood pressure management, to complex medical illness.  What is a Resident Physician?    Resident physicians hold medical degrees and are doctors. They are training to become specialists in Internal Medicine. They work under the supervision of board-certified faculty physicians.  Expectations for Your Care    We strive to provide accessible, quality care at all times.    In order to provide this care, it is best to see your primary care resident doctor consistently rather switching between providers.  In the event you do see another physician, you  should schedule a follow-up visit with your usual primary care doctor.    If you are transitioning your care from another clinic, it is helpful to have your records available for your doctor to review.    We do not prescribe controlled substances, such as ADD medications or narcotic pain medications, on your first visit.  We will review your health records and concerns prior to devising a treatment plan with you in order to provide the best care.      Clinic Services     Extended clinic hours; patient  to help navigate your visit;  parking; laboratory and imaging services with evening and weekend hours    Multiple medical and surgical specialties in one building    Complementary services, including Nutrition, Integrative Medicine, Pharmacy consultations, Mental and Behavioral Health, Sports Medicine and Physical Therapy    Thank You    We would like to thank you for choosing the [facility] Internal Medicine Resident Continuity Clinic for your primary care. You are making a priceless contribution to the training of the next generation of health care practitioners.     Contact us at [phone] for appointments or questions.    Resident Clinic Hours are Tuesdays and Thursdays, 7:30am-5:00pm    Residents  Grace Paul MD   (Female )   Sunshine Duran MD   (Female)   Mark Mcgill MD  (Male)   Adebayo Hanson MD  (Male)   Blanca Robertson MD   (Female)   Taiwo Michelle MD  (Male)    Prem Lou MD  (Male)   Jean-Paul Ghosh MD  (Male)   Adebayo Delaney MD (Male)   Jese Almanza MD  (Male)   Bree Lozano MD (Female)    Demetria Guevara MD (Female)   Mike Sanchez MD  (Male)   Sara Long MD(Female)   Chiqui Ramsey MD  (Female)    Supervising Physicians   MD Taryn Kirk MD Briar Duffy, MD James Langland, MD Mary Logeais, MD Tanya Melnik, MD Charles Moldow, MD Heather Thompson Buum, MD Kathleen Watson, MD             aerobic capacity/endurance/arousal, attention, and cognition/muscle strength

## 2023-06-08 LAB — SARS-COV-2 RNA SPEC QL NAA+PROBE: SIGNIFICANT CHANGE UP

## 2023-06-08 PROCEDURE — 99231 SBSQ HOSP IP/OBS SF/LOW 25: CPT

## 2023-06-08 RX ADMIN — Medication 1 APPLICATION(S): at 17:30

## 2023-06-08 RX ADMIN — Medication 200 MILLIGRAM(S): at 17:29

## 2023-06-08 RX ADMIN — Medication 1 APPLICATION(S): at 05:13

## 2023-06-08 RX ADMIN — ESCITALOPRAM OXALATE 5 MILLIGRAM(S): 10 TABLET, FILM COATED ORAL at 12:50

## 2023-06-08 RX ADMIN — Medication 1 MILLIGRAM(S): at 21:41

## 2023-06-08 RX ADMIN — ENOXAPARIN SODIUM 40 MILLIGRAM(S): 100 INJECTION SUBCUTANEOUS at 12:50

## 2023-06-08 RX ADMIN — Medication 200 MILLIGRAM(S): at 05:12

## 2023-06-08 NOTE — PROGRESS NOTE ADULT - ASSESSMENT
89 years old F with PMHx of eczema and dementia presents to ED with daughter who is the HCP  reports that she brings pt today for NH placement   as it has become too difficult to care for pt at home. She also reports pt has been having brief episodes of LOC over the last few weeks that seemed to   be precipitated by agitation. The episodes are described as brief spells of unresponsiveness without seizure like activities and usually she recovers after   5-10 minutes and feels better afterwards. Pt is AAO x 1 at baseline    # syncope/ LOC  most likely 2/2 vaso vagal response with functional declining   #recurrent episodes after agitation/ periods of high emotion  reeg neg  ekg wnl   family (daughter) not interested in aggressive measures/ work up  RR on 6/8 for fall, mechanical vs. vasovagal. X-ray hip negative. Pt too agitated for CT exams. D/w daughter and no further imaging w/u needed  discharge planning to snf/ long term at [place].  - dc tele  -pt removed IV because is confused. daughter requesting to not have IV replaced    #Unable to care for self.   ·  Plan: pending placement to [place].    #Dementia.   c/w  lexapro 5  c/w xanax     DVT ppx: lovenox  DNR/DNI  Dispo: pending placement

## 2023-06-08 NOTE — PROGRESS NOTE ADULT - SUBJECTIVE AND OBJECTIVE BOX
LENGTH OF HOSPITAL STAY: 3d    Overnight events: RR yesterday evening for fall  Complaints: hip pain following     CHIEF COMPLAINT:   Patient is a 89y old  Female who presents with a chief complaint of Placement (2023 09:07)        HISTORY OF PRESENTING ILLNESS:    HPI:  89 years old F with PMHx of eczema and dementia presents to ED with daughter who is the HCP  reports that she brings pt today for NH placement as it has become too difficult to care for pt at home. She also reports pt has been having brief episodes of LOC over the last few weeks that seemed to be precipitated by agitation. The episodes are described as brief spells of unresponsiveness without seizure like activities and usually she recovers after 5-10 minutes and feels better afterwards. Pt is AAO x 1 at baseline    In the ED pts VS were T(C): 36.7  T(F): 98  HR: 85  BP: 150/75  RR: 16  SpO2: 97%    Pt is admitted for possible placement. family is interested in [facility]     (2023 20:35)    PAST MEDICAL & SURGICAL HISTORY  PAST MEDICAL & SURGICAL HISTORY:  Dementia      Eczema        SOCIAL HISTORY:    ALLERGIES:  No Known Allergies    MEDICATIONS:  STANDING MEDICATIONS  ALPRAZolam 1 milliGRAM(s) Oral daily  cefpodoxime 200 milliGRAM(s) Oral every 12 hours  clobetasol 0.05% Cream 1 Application(s) Topical every 12 hours  enoxaparin Injectable 40 milliGRAM(s) SubCutaneous every 24 hours  escitalopram 5 milliGRAM(s) Oral daily    PRN MEDICATIONS  acetaminophen     Tablet .. 650 milliGRAM(s) Oral every 6 hours PRN  ondansetron Injectable 4 milliGRAM(s) IV Push every 8 hours PRN    VITALS:   T(F): 96.6  HR: 75  BP: 108/61  RR: 18  SpO2: --    LABS:            Urinalysis Basic - ( 2023 13:27 )    Color: Yellow / Appearance: Slightly Turbid / S.018 / pH: x  Gluc: x / Ketone: Negative  / Bili: Negative / Urobili: <2 mg/dL   Blood: x / Protein: Trace / Nitrite: Negative   Leuk Esterase: Large / RBC: 3 /HPF / WBC 75 /HPF   Sq Epi: x / Non Sq Epi: x / Bacteria: Many                  PHYSICAL EXAM:  GEN: No acute distress  LUNGS: Normal respiratory effort. Clear to auscultation bilaterally. on RA  HEART: S1/S2 present. RRR. no murmurs  ABD: Soft, non-tender, non-distended  EXT: no cyanosis or edema  NEURO: no focal deficits

## 2023-06-08 NOTE — PROGRESS NOTE ADULT - ASSESSMENT
89 years old F with PMHx of eczema and dementia presents to ED with daughter who is the HCP  reports that she brings pt today for NH placement   as it has become too difficult to care for pt at home. She also reports pt has been having brief episodes of LOC over the last few weeks that seemed to   be precipitated by agitation. The episodes are described as brief spells of unresponsiveness without seizure like activities and usually she recovers after   5-10 minutes and feels better afterwards. Pt is AAO x 1 at baseline          # Syncope.   ·  Plan: recurrent episodes after agitation/ periods of high emotion  possible vaso vagal response  reeg neg  ekg wnl   discharge planning to snf/ longterm at [place].    #Unable to care for self.   ·  Plan: pending placement to [place].    #Dementia.   ·  Plan: lexapro 5  xanax 1.    #On deep vein thrombosis (DVT) prophylaxis.   ·  Plan: lovenox.   89 years old F with PMHx of eczema and dementia presents to ED with daughter who is the HCP  reports that she brings pt today for NH placement   as it has become too difficult to care for pt at home. She also reports pt has been having brief episodes of LOC over the last few weeks that seemed to   be precipitated by agitation. The episodes are described as brief spells of unresponsiveness without seizure like activities and usually she recovers after   5-10 minutes and feels better afterwards. Pt is AAO x 1 at baseline          # syncope/ LOC .   ·  recurrent episodes after agitation/ periods of high emotion  possible vaso vagal response  reeg neg  ekg wnl   family not interested in aggressive measures   discharge planning to snf/ long term at [facility].    #Unable to care for self.   ·  Plan: pending placement to [facility].    #Dementia.   ·  Plan: lexapro 5  xanax 1.    #On deep vein thrombosis (DVT) prophylaxis.   ·  Plan: lovenox.    DNR/DNI 89 years old F with PMHx of eczema and dementia presents to ED with daughter who is the HCP  reports that she brings pt today for NH placement   as it has become too difficult to care for pt at home. She also reports pt has been having brief episodes of LOC over the last few weeks that seemed to   be precipitated by agitation. The episodes are described as brief spells of unresponsiveness without seizure like activities and usually she recovers after   5-10 minutes and feels better afterwards. Pt is AAO x 1 at baseline          # syncope/ LOC .   ·  recurrent episodes after agitation/ periods of high emotion  possible vaso vagal response with functional declining   reeg neg  ekg wnl   family (daughter) not interested in aggressive measures/ work up  discharge planning to snf/ long term at [facility].    #Unable to care for self.   ·  Plan: pending placement to [facility].    #Dementia.   ·  Plan: lexapro 5  xanax 1.    #On deep vein thrombosis (DVT) prophylaxis.   ·  Plan: lovenox.    DNR/DNI  pending placement

## 2023-06-08 NOTE — PROGRESS NOTE ADULT - SUBJECTIVE AND OBJECTIVE BOX
Patient is a 89y old  Female who presents with a chief complaint of Placement (2023 09:07)      OVERNIGHT EVENTS: daughter at bedside, per her the pt mental status  is at baseline and she doesn't want aggressive measures     SUBJECTIVE / INTERVAL HPI: Patient seen and examined at bedside.     VITAL SIGNS:  Vital Signs Last 24 Hrs  T(C): 35.9 (2023 13:27), Max: 36.3 (2023 15:35)  T(F): 96.6 (2023 13:27), Max: 97.3 (2023 15:35)  HR: 75 (2023 13:27) (75 - 86)  BP: 108/61 (2023 13:27) (108/61 - 141/65)  BP(mean): 93 (2023 20:14) (89 - 93)  RR: 18 (2023 13:27) (18 - 18)  SpO2: 94% (2023 15:35) (94% - 94%)    Parameters below as of 2023 15:35  Patient On (Oxygen Delivery Method): room air        PHYSICAL EXAM:    General: old thin lady chronically look ill   HEENT: NC/AT; PERRL, clear conjunctiva  Neck: supple  Cardiovascular: +S1/S2; RRR  Respiratory: CTA b/l; no W/R/R  Gastrointestinal: soft, NT/ND; +BSx4  Extremities: WWP; 2+ peripheral pulses; no edema   Neurological: AAOx1; moves all exts, no focal deficits    MEDICATIONS:  MEDICATIONS  (STANDING):  ALPRAZolam 1 milliGRAM(s) Oral daily  cefpodoxime 200 milliGRAM(s) Oral every 12 hours  clobetasol 0.05% Cream 1 Application(s) Topical every 12 hours  enoxaparin Injectable 40 milliGRAM(s) SubCutaneous every 24 hours  escitalopram 5 milliGRAM(s) Oral daily    MEDICATIONS  (PRN):  acetaminophen     Tablet .. 650 milliGRAM(s) Oral every 6 hours PRN Temp greater or equal to 38C (100.4F), Mild Pain (1 - 3)  ondansetron Injectable 4 milliGRAM(s) IV Push every 8 hours PRN Nausea and/or Vomiting      ALLERGIES:  Allergies    No Known Allergies    Intolerances        LABS:            Urinalysis Basic - ( 2023 13:27 )    Color: Yellow / Appearance: Slightly Turbid / S.018 / pH: x  Gluc: x / Ketone: Negative  / Bili: Negative / Urobili: <2 mg/dL   Blood: x / Protein: Trace / Nitrite: Negative   Leuk Esterase: Large / RBC: 3 /HPF / WBC 75 /HPF   Sq Epi: x / Non Sq Epi: x / Bacteria: Many      CAPILLARY BLOOD GLUCOSE      POCT Blood Glucose.: 99 mg/dL (2023 19:25)      RADIOLOGY & ADDITIONAL TESTS: Reviewed.    ASSESSMENT:    PLAN:

## 2023-06-09 LAB
CULTURE RESULTS: SIGNIFICANT CHANGE UP
SPECIMEN SOURCE: SIGNIFICANT CHANGE UP

## 2023-06-09 PROCEDURE — 99232 SBSQ HOSP IP/OBS MODERATE 35: CPT

## 2023-06-09 RX ORDER — ALPRAZOLAM 0.25 MG
1 TABLET ORAL ONCE
Refills: 0 | Status: DISCONTINUED | OUTPATIENT
Start: 2023-06-09 | End: 2023-06-09

## 2023-06-09 RX ADMIN — Medication 1 MILLIGRAM(S): at 22:37

## 2023-06-09 RX ADMIN — Medication 200 MILLIGRAM(S): at 17:48

## 2023-06-09 RX ADMIN — Medication 200 MILLIGRAM(S): at 05:55

## 2023-06-09 RX ADMIN — Medication 1 APPLICATION(S): at 17:48

## 2023-06-09 RX ADMIN — ENOXAPARIN SODIUM 40 MILLIGRAM(S): 100 INJECTION SUBCUTANEOUS at 11:40

## 2023-06-09 RX ADMIN — ESCITALOPRAM OXALATE 5 MILLIGRAM(S): 10 TABLET, FILM COATED ORAL at 11:40

## 2023-06-09 RX ADMIN — Medication 1 APPLICATION(S): at 05:56

## 2023-06-09 NOTE — PROGRESS NOTE ADULT - ASSESSMENT
89 years old F with PMHx of eczema and dementia presents to ED with daughter who is the HCP  reports that she brings pt today for NH placement   as it has become too difficult to care for pt at home. She also reports pt has been having brief episodes of LOC over the last few weeks that seemed to   be precipitated by agitation. The episodes are described as brief spells of unresponsiveness without seizure like activities and usually she recovers after   5-10 minutes and feels better afterwards. Pt is AAO x 1 at baseline    # syncope/ LOC  most likely 2/2 vaso vagal response with functional declining   #recurrent episodes after agitation/ periods of high emotion  reeg neg  ekg wnl   family (daughter) not interested in aggressive measures/ work up  RR on 6/8 for fall, mechanical vs. vasovagal. X-ray hip negative. Pt too agitated for CT exams. D/w daughter and no further imaging w/u needed  discharge planning to snf/ long term at [place].  - dc tele  -pt removed IV because is confused. daughter requesting to not have IV replaced    #Unable to care for self.   ·  Plan: pending placement to [place].    #Dementia.   c/w  lexapro 5  c/w xanax     DVT ppx: lovenox  DNR/DNI  Dispo: pending placement to [facility]

## 2023-06-09 NOTE — PROGRESS NOTE ADULT - ASSESSMENT
89 years old Female with PMHx of eczema and dementia presents to ED with daughter for NH placement as it has become too difficult to care for pt at home. She also reports pt has been having brief episodes of LOC over the last few weeks that seemed to   be precipitated by agitation.    A/P:   Syncope:   Recurrent Episode after agitation, likely vaso-vagal.   Labs reviewed, no anemia, Troponin negative.   EKG showed Normal Sinus Rhythm, no arrhythmia on telemetry.   EEG was negative.   No further work up is need, her daughter is not interested in aggressive work up      Dementia.   At baseline, alerited and oriented x1.   Continue Lexapro.     #On deep vein thrombosis (DVT) prophylaxis.   ·  Plan: lovenox.    DNR/DNI  pending placement    89 years old Female with PMHx of eczema and dementia presents to ED with daughter for NH placement as it has become too difficult to care for pt at home. She also reports pt has been having brief episodes of LOC over the last few weeks that seemed to   be precipitated by agitation.    A/P:   Syncope:   Recurrent Episode after agitation, likely vaso-vagal.   Labs reviewed, no anemia, Troponin negative.   EKG showed Normal Sinus Rhythm, no arrhythmia on telemetry.   EEG was negative.   No further work up is need, her daughter is not interested in aggressive work up      Dementia.   At baseline, alerited and oriented x1.   Continue Lexapro.     #On deep vein thrombosis (DVT) prophylaxis.   ·  Plan: lovenox.    DNR/DNI  pending placement   Peer to peer was done on 6/9/23, she declined for STR, pending NH placement.

## 2023-06-09 NOTE — DISCHARGE NOTE PROVIDER - NSDCCPCAREPLAN_GEN_ALL_CORE_FT
PRINCIPAL DISCHARGE DIAGNOSIS  Diagnosis: Brief loss of consciousness  Assessment and Plan of Treatment: Syncope  Syncope is when you temporarily lose consciousness, also called fainting or passing out. It is caused by a sudden decrease in blood flow to the brain. Even though most causes of syncope are not dangerous, syncope can possibly be a sign of a serious medical problem. Signs that you may be about to faint include feeling dizzy, lightheaded, nausea, visual changes, or cold/clammy skin. Do not drive, operate heavy machinery, or play sports until your health care provider says it is okay.  SEEK IMMEDIATE MEDICAL CARE IF YOU HAVE ANY OF THE FOLLOWING SYMPTOMS: severe headache, pain in your chest/abdomen/back, bleeding from your mouth or rectum, palpitations, shortness of breath, pain with breathing, seizure, confusion, or trouble walking.        SECONDARY DISCHARGE DIAGNOSES  Diagnosis: Hospital admission due to social situation  Assessment and Plan of Treatment:      PRINCIPAL DISCHARGE DIAGNOSIS  Diagnosis: Brief loss of consciousness  Assessment and Plan of Treatment: We suspect that pt has been having vasovagal episodes when agitated. No events on tele, EKG normal, negative orthostatics, family requested to limit the workup

## 2023-06-09 NOTE — PROGRESS NOTE ADULT - SUBJECTIVE AND OBJECTIVE BOX
LENGTH OF HOSPITAL STAY: 4d    Overnight events:  Complaints:    CHIEF COMPLAINT:   Patient is a 89y old  Female who presents with a chief complaint of Placement (2023 15:35)        HISTORY OF PRESENTING ILLNESS:    HPI:  89 years old F with PMHx of eczema and dementia presents to ED with daughter who is the HCP  reports that she brings pt today for NH placement as it has become too difficult to care for pt at home. She also reports pt has been having brief episodes of LOC over the last few weeks that seemed to be precipitated by agitation. The episodes are described as brief spells of unresponsiveness without seizure like activities and usually she recovers after 5-10 minutes and feels better afterwards. Pt is AAO x 1 at baseline    In the ED pts VS were T(C): 36.7  T(F): 98  HR: 85  BP: 150/75  RR: 16  SpO2: 97%    Pt is admitted for possible placement. family is interested in [facility]     (2023 20:35)    PAST MEDICAL & SURGICAL HISTORY  PAST MEDICAL & SURGICAL HISTORY:  Dementia      Eczema        SOCIAL HISTORY:    ALLERGIES:  No Known Allergies    MEDICATIONS:  STANDING MEDICATIONS  ALPRAZolam 1 milliGRAM(s) Oral daily  cefpodoxime 200 milliGRAM(s) Oral every 12 hours  clobetasol 0.05% Cream 1 Application(s) Topical every 12 hours  enoxaparin Injectable 40 milliGRAM(s) SubCutaneous every 24 hours  escitalopram 5 milliGRAM(s) Oral daily    PRN MEDICATIONS  acetaminophen     Tablet .. 650 milliGRAM(s) Oral every 6 hours PRN  ondansetron Injectable 4 milliGRAM(s) IV Push every 8 hours PRN    VITALS:   T(F): 97  HR: 71  BP: 114/56  RR: 18  SpO2: 95%    LABS:            Urinalysis Basic - ( 2023 13:27 )    Color: Yellow / Appearance: Slightly Turbid / S.018 / pH: x  Gluc: x / Ketone: Negative  / Bili: Negative / Urobili: <2 mg/dL   Blood: x / Protein: Trace / Nitrite: Negative   Leuk Esterase: Large / RBC: 3 /HPF / WBC 75 /HPF   Sq Epi: x / Non Sq Epi: x / Bacteria: Many                  PHYSICAL EXAM:  GEN: No acute distress  LUNGS: Normal respiratory effort. Clear to auscultation bilaterally. on RA  HEART: S1/S2 present. RRR. no murmurs  ABD: Soft, non-tender, non-distended  EXT: no cyanosis or edema  NEURO: no focal deficits   LENGTH OF HOSPITAL STAY: 4d    Overnight events: none  Complaints: none    CHIEF COMPLAINT:   Patient is a 89y old  Female who presents with a chief complaint of Placement (2023 15:35)        HISTORY OF PRESENTING ILLNESS:    HPI:  89 years old F with PMHx of eczema and dementia presents to ED with daughter who is the HCP  reports that she brings pt today for NH placement as it has become too difficult to care for pt at home. She also reports pt has been having brief episodes of LOC over the last few weeks that seemed to be precipitated by agitation. The episodes are described as brief spells of unresponsiveness without seizure like activities and usually she recovers after 5-10 minutes and feels better afterwards. Pt is AAO x 1 at baseline    In the ED pts VS were T(C): 36.7  T(F): 98  HR: 85  BP: 150/75  RR: 16  SpO2: 97%    Pt is admitted for possible placement. family is interested in [facility]     (2023 20:35)    PAST MEDICAL & SURGICAL HISTORY  PAST MEDICAL & SURGICAL HISTORY:  Dementia      Eczema        SOCIAL HISTORY:    ALLERGIES:  No Known Allergies    MEDICATIONS:  STANDING MEDICATIONS  ALPRAZolam 1 milliGRAM(s) Oral daily  cefpodoxime 200 milliGRAM(s) Oral every 12 hours  clobetasol 0.05% Cream 1 Application(s) Topical every 12 hours  enoxaparin Injectable 40 milliGRAM(s) SubCutaneous every 24 hours  escitalopram 5 milliGRAM(s) Oral daily    PRN MEDICATIONS  acetaminophen     Tablet .. 650 milliGRAM(s) Oral every 6 hours PRN  ondansetron Injectable 4 milliGRAM(s) IV Push every 8 hours PRN    VITALS:   T(F): 97  HR: 71  BP: 114/56  RR: 18  SpO2: 95%    LABS:            Urinalysis Basic - ( 2023 13:27 )    Color: Yellow / Appearance: Slightly Turbid / S.018 / pH: x  Gluc: x / Ketone: Negative  / Bili: Negative / Urobili: <2 mg/dL   Blood: x / Protein: Trace / Nitrite: Negative   Leuk Esterase: Large / RBC: 3 /HPF / WBC 75 /HPF   Sq Epi: x / Non Sq Epi: x / Bacteria: Many                  PHYSICAL EXAM:  GEN: No acute distress  LUNGS: Normal respiratory effort. Clear to auscultation bilaterally. on RA  HEART: S1/S2 present. RRR. no murmurs  ABD: Soft, non-tender, non-distended  EXT: no cyanosis or edema  NEURO: no focal deficits

## 2023-06-09 NOTE — DISCHARGE NOTE PROVIDER - NSDCMRMEDTOKEN_GEN_ALL_CORE_FT
Clobetasol (Eqv-Temovate E) 0.05% topical cream: Apply topically to affected area once a day  Lexapro 5 mg oral tablet: 1 tab(s) orally once a day (at bedtime)  Xanax 0.25 mg oral tablet: 1 orally once a day   Clobetasol (Eqv-Temovate E) 0.05% topical cream: Apply topically to affected area once a day  Lexapro 5 mg oral tablet: 1 tab(s) orally once a day (at bedtime)  polyethylene glycol 3350 oral powder for reconstitution: 17 gram(s) orally 2 times a day  senna leaf extract oral tablet: 2 tab(s) orally once a day (at bedtime)  Xanax 0.25 mg oral tablet: 1 tablet orally once a day as needed for  anxiety

## 2023-06-09 NOTE — DISCHARGE NOTE PROVIDER - HOSPITAL COURSE
89 years old F with PMHx of eczema and dementia presents to ED with daughter who is the HCP  reports that she brings pt today for NH placement   as it has become too difficult to care for pt at home. She also reports pt has been having brief episodes of LOC over the last few weeks that seemed to   be precipitated by agitation. The episodes are described as brief spells of unresponsiveness without seizure like activities and usually she recovers after   5-10 minutes and feels better afterwards. Pt is AAO x 1 at baseline    # syncope/ LOC  most likely 2/2 vaso vagal response with functional declining   #recurrent episodes after agitation/ periods of high emotion  reeg neg  ekg wnl   family (daughter) not interested in aggressive measures/ work up  RR on 6/8 for fall, mechanical vs. vasovagal. X-ray hip negative. Pt too agitated for CT exams. D/w daughter and no further imaging w/u needed    #Unable to care for self.   Discharge to NH

## 2023-06-10 PROCEDURE — 99232 SBSQ HOSP IP/OBS MODERATE 35: CPT

## 2023-06-10 RX ORDER — ALPRAZOLAM 0.25 MG
1 TABLET ORAL
Refills: 0 | Status: DISCONTINUED | OUTPATIENT
Start: 2023-06-10 | End: 2023-06-13

## 2023-06-10 RX ADMIN — Medication 1 MILLIGRAM(S): at 18:13

## 2023-06-10 RX ADMIN — Medication 1 APPLICATION(S): at 18:15

## 2023-06-10 RX ADMIN — Medication 200 MILLIGRAM(S): at 06:39

## 2023-06-10 RX ADMIN — Medication 1 APPLICATION(S): at 06:40

## 2023-06-10 RX ADMIN — ESCITALOPRAM OXALATE 5 MILLIGRAM(S): 10 TABLET, FILM COATED ORAL at 11:02

## 2023-06-10 NOTE — PROGRESS NOTE ADULT - SUBJECTIVE AND OBJECTIVE BOX
MARCOS TAYLOR 89y Female  MRN#: [id number]   Hospital Day: 5d    SUBJECTIVE  Patient is a 89y old Female who presents with a chief complaint of Placement (09 Jun 2023 15:34)  Currently admitted to medicine with the primary diagnosis of Brief loss of consciousness      INTERVAL HPI AND OVERNIGHT EVENTS:  Patient was examined and seen at bedside. This morning she is resting comfortably in bed and reports no issues or overnight events.    OBJECTIVE  PAST MEDICAL & SURGICAL HISTORY  Dementia    Eczema      ALLERGIES:  No Known Allergies    MEDICATIONS:  STANDING MEDICATIONS  ALPRAZolam 1 milliGRAM(s) Oral daily  clobetasol 0.05% Cream 1 Application(s) Topical every 12 hours  enoxaparin Injectable 40 milliGRAM(s) SubCutaneous every 24 hours  escitalopram 5 milliGRAM(s) Oral daily    PRN MEDICATIONS  acetaminophen     Tablet .. 650 milliGRAM(s) Oral every 6 hours PRN  ondansetron Injectable 4 milliGRAM(s) IV Push every 8 hours PRN      VITAL SIGNS: Last 24 Hours  T(C): 35.7 (09 Jun 2023 20:25), Max: 36.2 (09 Jun 2023 17:33)  T(F): 96.3 (09 Jun 2023 20:25), Max: 97.2 (09 Jun 2023 17:33)  HR: 77 (10 Michael 2023 06:52) (73 - 80)  BP: 111/72 (10 Michael 2023 06:52) (111/72 - 137/63)  BP(mean): --  RR: 18 (10 Michael 2023 06:52) (16 - 18)  SpO2: --    LABS:                    Culture - Urine (collected 07 Jun 2023 13:27)  Source: Clean Catch Clean Catch (Midstream)  Final Report (09 Jun 2023 19:10):    >=3 organisms. Probable collection contamination.        ASSESSMENT and PLAN   89 years old F with PMHx of eczema and dementia presents to ED with daughter who is the HCP  reports that she brings pt today for NH placement   as it has become too difficult to care for pt at home. She also reports pt has been having brief episodes of LOC over the last few weeks that seemed to   be precipitated by agitation. The episodes are described as brief spells of unresponsiveness without seizure like activities and usually she recovers after   5-10 minutes and feels better afterwards. Pt is AAO x 1 at baseline    # syncope/ LOC  most likely 2/2 vaso vagal response with functional declining   #recurrent episodes after agitation/ periods of high emotion  reeg neg  ekg wnl   family (daughter) not interested in aggressive measures/ work up  RR on 6/8 for fall, mechanical vs. vasovagal. X-ray hip negative. Pt too agitated for CT exams. D/w daughter and no further imaging w/u needed  discharge planning to snf/ long term at [place].  - dc tele  -pt removed IV because is confused. daughter requesting to not have IV replaced  - No need for daily labs.    #Unable to care for self.   ·  Plan: pending placement to [place].    #Dementia.   c/w  lexapro 5  c/w xanax     DVT ppx: lovenox  DNR/DNI  Dispo: pending placement to [facility]

## 2023-06-10 NOTE — PROGRESS NOTE ADULT - ATTENDING COMMENTS
89 years old Female with PMHx of eczema and dementia presents to ED with daughter for NH placement as it has become too difficult to care for pt at home. She also reports pt has been having brief episodes of LOC over the last few weeks that seemed to   be precipitated by agitation.    ROS: pt is comfortable, denied any pain. Overnight was agitated, per son at bedside, she gets xanax around 5pm daily at home which keeps her calm.     A/P:   Syncope:   Recurrent Episode after agitation, likely vaso-vagal.   Labs reviewed, no anemia, Troponin negative.   EKG showed Normal Sinus Rhythm, no arrhythmia on telemetry.   EEG was negative.   No further work up is need, her daughter is not interested in aggressive work up      Dementia.   At baseline, alert and oriented x1.   Continue Lexapro.     #On deep vein thrombosis (DVT) prophylaxis.   ·  Plan: lovenox.    DNR/DNI  pending placement   Peer to peer was done on 6/9/23, she declined for STR, pending NH placement.

## 2023-06-11 PROCEDURE — 99231 SBSQ HOSP IP/OBS SF/LOW 25: CPT

## 2023-06-11 RX ADMIN — ESCITALOPRAM OXALATE 5 MILLIGRAM(S): 10 TABLET, FILM COATED ORAL at 17:01

## 2023-06-11 RX ADMIN — ENOXAPARIN SODIUM 40 MILLIGRAM(S): 100 INJECTION SUBCUTANEOUS at 12:52

## 2023-06-11 RX ADMIN — Medication 1 MILLIGRAM(S): at 17:01

## 2023-06-11 RX ADMIN — Medication 1 APPLICATION(S): at 17:32

## 2023-06-11 RX ADMIN — Medication 1 APPLICATION(S): at 06:27

## 2023-06-11 NOTE — PROGRESS NOTE ADULT - SUBJECTIVE AND OBJECTIVE BOX
Patient is a 89y old  Female who presents with a chief complaint of Placement (11 Jun 2023 09:07)      OVERNIGHT EVENTS: remained stable  denies fever, chills, syncope, seizure, headache, cough, SOB, abd pain, N/V/D, urinary symptoms, legs swelling,     SUBJECTIVE / INTERVAL HPI: Patient seen and examined at bedside.     VITAL SIGNS:  Vital Signs Last 24 Hrs  T(C): 36.2 (11 Jun 2023 05:01), Max: 36.2 (11 Jun 2023 05:01)  T(F): 97.2 (11 Jun 2023 05:01), Max: 97.2 (11 Jun 2023 05:01)  HR: 75 (11 Jun 2023 05:01) (75 - 79)  BP: 122/71 (11 Jun 2023 05:01) (118/67 - 122/71)  BP(mean): --  RR: 18 (11 Jun 2023 05:01) (18 - 18)  SpO2: --        PHYSICAL EXAM:    General: old thin lady not in distress   HEENT: NC/AT; PERRL, clear conjunctiva  Neck: supple  Cardiovascular: +S1/S2; RRR  Respiratory: CTA b/l; no W/R/R  Gastrointestinal: soft, NT/ND; +BSx4  Extremities: WWP; 2+ peripheral pulses; no edema   Neurological: AAOx1-2; no focal deficits    MEDICATIONS:  MEDICATIONS  (STANDING):  ALPRAZolam 1 milliGRAM(s) Oral <User Schedule>  clobetasol 0.05% Cream 1 Application(s) Topical every 12 hours  enoxaparin Injectable 40 milliGRAM(s) SubCutaneous every 24 hours  escitalopram 5 milliGRAM(s) Oral daily    MEDICATIONS  (PRN):  acetaminophen     Tablet .. 650 milliGRAM(s) Oral every 6 hours PRN Temp greater or equal to 38C (100.4F), Mild Pain (1 - 3)  ondansetron Injectable 4 milliGRAM(s) IV Push every 8 hours PRN Nausea and/or Vomiting      ALLERGIES:  Allergies    No Known Allergies    Intolerances        LABS:              CAPILLARY BLOOD GLUCOSE          RADIOLOGY & ADDITIONAL TESTS: Reviewed.    ASSESSMENT:    PLAN:

## 2023-06-11 NOTE — PROGRESS NOTE ADULT - ASSESSMENT
89 years old Female with PMHx of eczema and dementia presents to ED with daughter for NH placement as it has become too difficult to care for pt at home. She also reports pt has been having brief episodes of LOC over the last few weeks that seemed to   be precipitated by agitation.    ROS: pt is comfortable, denied any pain. Overnight was agitated, per son at bedside, she gets xanax around 5pm daily at home which keeps her calm.     A/P:   Syncope:   Recurrent Episode after agitation, likely vaso-vagal.   Labs reviewed, no anemia, Troponin negative.   EKG showed Normal Sinus Rhythm, no arrhythmia on telemetry.   EEG was negative.   No further work up is need, her daughter is not interested in aggressive work up      Dementia.   At baseline, alert and oriented x1.   Continue Lexapro.     #On deep vein thrombosis (DVT) prophylaxis.   ·  Plan: lovenox.    DNR/DNI  pending placement   Peer to peer was done on 6/9/23, she declined for STR, pending NH placement.    89 years old Female with PMHx of eczema and dementia presents to ED with daughter for NH placement as it has become too difficult to care for pt at home. She also reports pt has been having brief episodes of LOC over the last few weeks that seemed to   be precipitated by agitation.        A/P:   Syncope:   Recurrent Episode after agitation, likely vaso-vagal.   Labs reviewed, no anemia, Troponin negative.   EKG showed Normal Sinus Rhythm, no arrhythmia on telemetry.   EEG was negative.   No further work up is need, her daughter is not interested in aggressive work up      Dementia.   At baseline  Continue Lexapro.     #On deep vein thrombosis (DVT) prophylaxis.   ·  Plan: lovenox.    DNR/DNI  pending placement   Peer to peer was done on 6/9/23, she declined for STR, pending NH placement.

## 2023-06-12 PROCEDURE — 99231 SBSQ HOSP IP/OBS SF/LOW 25: CPT

## 2023-06-12 RX ADMIN — Medication 1 APPLICATION(S): at 06:53

## 2023-06-12 RX ADMIN — ENOXAPARIN SODIUM 40 MILLIGRAM(S): 100 INJECTION SUBCUTANEOUS at 11:09

## 2023-06-12 RX ADMIN — Medication 1 APPLICATION(S): at 17:54

## 2023-06-12 RX ADMIN — Medication 1 MILLIGRAM(S): at 17:52

## 2023-06-12 RX ADMIN — ESCITALOPRAM OXALATE 5 MILLIGRAM(S): 10 TABLET, FILM COATED ORAL at 11:09

## 2023-06-12 NOTE — PROGRESS NOTE ADULT - ASSESSMENT
89 years old Female with PMHx of eczema and dementia presents to ED with daughter for NH placement as it has become too difficult to care for pt at home. She also reports pt has been having brief episodes of LOC over the last few weeks that seemed to   be precipitated by agitation.        A/P:   Syncope:   Recurrent Episode after agitation, likely vaso-vagal.   Labs reviewed, no anemia, Troponin negative.   EKG showed Normal Sinus Rhythm, no arrhythmia on telemetry.   EEG was negative.   No further work up is need, her daughter is not interested in aggressive work up      Dementia.   At baseline  Continue Lexapro.     #On deep vein thrombosis (DVT) prophylaxis.   ·  Plan: lovenox.    DNR/DNI  pending placement   Peer to peer was done on 6/9/23, she declined for STR, pending NH placement.

## 2023-06-12 NOTE — PROGRESS NOTE ADULT - SUBJECTIVE AND OBJECTIVE BOX
Patient is a 89y old  Female who presents with a chief complaint of Placement (11 Jun 2023 09:07)      OVERNIGHT EVENTS: remained stable, no major events reported     SUBJECTIVE / INTERVAL HPI: Patient seen and examined at bedside.     VITAL SIGNS:  Vital Signs Last 24 Hrs  T(C): 35.7 (12 Jun 2023 04:40), Max: 35.7 (11 Jun 2023 20:25)  T(F): 96.3 (12 Jun 2023 04:40), Max: 96.3 (12 Jun 2023 04:40)  HR: 79 (12 Jun 2023 04:40) (78 - 89)  BP: 142/75 (12 Jun 2023 04:40) (129/62 - 142/75)  BP(mean): --  RR: 18 (12 Jun 2023 04:40) (18 - 20)  SpO2: --        PHYSICAL EXAM:    General: old thin lady not in distress   HEENT: NC/AT; PERRL, clear conjunctiva  Neck: supple  Cardiovascular: +S1/S2; RRR  Respiratory: CTA b/l; no W/R/R  Gastrointestinal: soft, NT/ND; +BSx4  Extremities: WWP; 2+ peripheral pulses; no edema   Neurological: AAOx1-2; no focal deficits    MEDICATIONS:  MEDICATIONS  (STANDING):  ALPRAZolam 1 milliGRAM(s) Oral <User Schedule>  clobetasol 0.05% Cream 1 Application(s) Topical every 12 hours  enoxaparin Injectable 40 milliGRAM(s) SubCutaneous every 24 hours  escitalopram 5 milliGRAM(s) Oral daily    MEDICATIONS  (PRN):  acetaminophen     Tablet .. 650 milliGRAM(s) Oral every 6 hours PRN Temp greater or equal to 38C (100.4F), Mild Pain (1 - 3)      ALLERGIES:  Allergies    No Known Allergies    Intolerances        LABS:              CAPILLARY BLOOD GLUCOSE          RADIOLOGY & ADDITIONAL TESTS: Reviewed.    ASSESSMENT:    PLAN:

## 2023-06-12 NOTE — PROGRESS NOTE ADULT - SUBJECTIVE AND OBJECTIVE BOX
MARCOS TAYLOR 89y Female  MRN#: [id number]   Hospital Day: 7d    SUBJECTIVE  Patient is a 89y old Female who presents with a chief complaint of Placement (12 Jun 2023 08:40)  Currently admitted to medicine with the primary diagnosis of Brief loss of consciousness      INTERVAL HPI AND OVERNIGHT EVENTS:  Patient was examined and seen at bedside. This morning she is resting comfortably in bed and reports no issues or overnight events.    OBJECTIVE  PAST MEDICAL & SURGICAL HISTORY  Dementia    Eczema      ALLERGIES:  No Known Allergies    MEDICATIONS:  STANDING MEDICATIONS  ALPRAZolam 1 milliGRAM(s) Oral <User Schedule>  clobetasol 0.05% Cream 1 Application(s) Topical every 12 hours  enoxaparin Injectable 40 milliGRAM(s) SubCutaneous every 24 hours  escitalopram 5 milliGRAM(s) Oral daily    PRN MEDICATIONS  acetaminophen     Tablet .. 650 milliGRAM(s) Oral every 6 hours PRN      VITAL SIGNS: Last 24 Hours  T(C): 35.7 (12 Jun 2023 04:40), Max: 35.7 (11 Jun 2023 20:25)  T(F): 96.3 (12 Jun 2023 04:40), Max: 96.3 (12 Jun 2023 04:40)  HR: 79 (12 Jun 2023 04:40) (78 - 89)  BP: 142/75 (12 Jun 2023 04:40) (129/62 - 142/75)  BP(mean): --  RR: 18 (12 Jun 2023 04:40) (18 - 20)  SpO2: --    LABS:                        RADIOLOGY:      PHYSICAL EXAM:  CONSTITUTIONAL: No acute distress, AAOx0   HEAD: Atraumatic, normocephalic  EYES: EOM intact, PERRLA, conjunctiva and sclera clear  ENT: moist mucous membranes  PULMONARY: Clear to auscultation bilaterally  CARDIOVASCULAR: Regular rate and rhythm  GASTROINTESTINAL: Soft, non-tender, non-distended; bowel sounds present  MUSCULOSKELETAL: no edema  NEUROLOGY: non-focal  SKIN: warm and dry      ASSESSMENT and PLAN   89 years old F with PMHx of eczema and dementia presents to ED with daughter who is the HCP  reports that she brings pt today for NH placement   as it has become too difficult to care for pt at home. She also reports pt has been having brief episodes of LOC over the last few weeks that seemed to   be precipitated by agitation. The episodes are described as brief spells of unresponsiveness without seizure like activities and usually she recovers after   5-10 minutes and feels better afterwards. Pt is AAO x 1 at baseline    # syncope/ LOC  most likely 2/2 vaso vagal response with functional declining   #recurrent episodes after agitation/ periods of high emotion  reeg neg  ekg wnl   family (daughter) not interested in aggressive measures/ work up  RR on 6/8 for fall, mechanical vs. vasovagal. X-ray hip negative. Pt too agitated for CT exams. D/w daughter and no further imaging w/u needed  discharge planning to snf/ long term at [place].  - dc tele  -pt removed IV because is confused. daughter requesting to not have IV replaced  - No need for daily labs.  - Monitor BM  - Check orthostatics     #Unable to care for self.   ·  Plan: pending placement to [place].    #Dementia.   c/w  lexapro 5  c/w xanax     DVT ppx: lovenox  DNR/DNI  Dispo: pending placement to [facility]

## 2023-06-13 VITALS — RESPIRATION RATE: 20 BRPM | DIASTOLIC BLOOD PRESSURE: 75 MMHG | HEART RATE: 73 BPM | SYSTOLIC BLOOD PRESSURE: 136 MMHG

## 2023-06-13 LAB — SARS-COV-2 RNA SPEC QL NAA+PROBE: SIGNIFICANT CHANGE UP

## 2023-06-13 PROCEDURE — 99239 HOSP IP/OBS DSCHRG MGMT >30: CPT

## 2023-06-13 RX ORDER — POLYETHYLENE GLYCOL 3350 17 G/17G
17 POWDER, FOR SOLUTION ORAL
Refills: 0 | Status: DISCONTINUED | OUTPATIENT
Start: 2023-06-13 | End: 2023-06-13

## 2023-06-13 RX ORDER — SENNA PLUS 8.6 MG/1
2 TABLET ORAL
Qty: 0 | Refills: 0 | DISCHARGE
Start: 2023-06-13

## 2023-06-13 RX ORDER — SENNA PLUS 8.6 MG/1
2 TABLET ORAL AT BEDTIME
Refills: 0 | Status: DISCONTINUED | OUTPATIENT
Start: 2023-06-13 | End: 2023-06-13

## 2023-06-13 RX ORDER — ALPRAZOLAM 0.25 MG
1 TABLET ORAL
Qty: 0 | Refills: 0 | DISCHARGE

## 2023-06-13 RX ORDER — POLYETHYLENE GLYCOL 3350 17 G/17G
17 POWDER, FOR SOLUTION ORAL
Qty: 0 | Refills: 0 | DISCHARGE
Start: 2023-06-13

## 2023-06-13 RX ADMIN — ESCITALOPRAM OXALATE 5 MILLIGRAM(S): 10 TABLET, FILM COATED ORAL at 12:34

## 2023-06-13 RX ADMIN — Medication 1 APPLICATION(S): at 05:51

## 2023-06-13 RX ADMIN — Medication 1 MILLIGRAM(S): at 17:40

## 2023-06-13 RX ADMIN — Medication 1 APPLICATION(S): at 17:41

## 2023-06-13 RX ADMIN — ENOXAPARIN SODIUM 40 MILLIGRAM(S): 100 INJECTION SUBCUTANEOUS at 12:34

## 2023-06-13 RX ADMIN — POLYETHYLENE GLYCOL 3350 17 GRAM(S): 17 POWDER, FOR SOLUTION ORAL at 17:41

## 2023-06-13 RX ADMIN — POLYETHYLENE GLYCOL 3350 17 GRAM(S): 17 POWDER, FOR SOLUTION ORAL at 12:34

## 2023-06-13 NOTE — PROGRESS NOTE ADULT - PROVIDER SPECIALTY LIST ADULT
Hospitalist
Hospitalist
Internal Medicine
Internal Medicine
Hospitalist
Hospitalist
Internal Medicine
Hospitalist
Internal Medicine
Internal Medicine

## 2023-06-13 NOTE — DISCHARGE NOTE NURSING/CASE MANAGEMENT/SOCIAL WORK - NSDCPEFALRISK_GEN_ALL_CORE
For information on Fall & Injury Prevention, visit: [email or web address] OR  [email or web address] OR  https://www.cdc.gov/steadi/patient.html

## 2023-06-13 NOTE — PROGRESS NOTE ADULT - SUBJECTIVE AND OBJECTIVE BOX
Patient is a 89y old  Female who presents with a chief complaint of Placement (13 Jun 2023 09:55)    INTERVAL HPI/OVERNIGHT EVENTS: Patient was examined and seen at bedside. This morning pt is resting comfortably in bed and pt/staff/daughter/son report no new issues or overnight events. No complaints, feels well. Confused.  ROS: Denies CP, SOB, AP, new weakness    InitialHPI:  89 years old F with PMHx of eczema and dementia presents to ED with daughter who is the HCP  reports that she brings pt today for NH placement as it has become too difficult to care for pt at home. She also reports pt has been having brief episodes of LOC over the last few weeks that seemed to be precipitated by agitation. The episodes are described as brief spells of unresponsiveness without seizure like activities and usually she recovers after 5-10 minutes and feels better afterwards. Pt is AAO x 1 at baseline    In the ED pts VS were T(C): 36.7  T(F): 98  HR: 85  BP: 150/75  RR: 16  SpO2: 97%    Pt is admitted for possible placement. family is interested in [facility]     (05 Jun 2023 20:35)    PAST MEDICAL & SURGICAL HISTORY:  Dementia      Eczema          General: NAD, AAOx1  HEENT:  EOMI, no LAD  CV: S1 S2  Resp: decreased breath sounds at bases  GI: NT/ND/S +BS  MS: no clubbing/cyanosis/edema, + pulses b/l  Neuro: nonfocal, +reflexes thruout    MEDICATIONS  (STANDING):  ALPRAZolam 1 milliGRAM(s) Oral <User Schedule>  clobetasol 0.05% Cream 1 Application(s) Topical every 12 hours  enoxaparin Injectable 40 milliGRAM(s) SubCutaneous every 24 hours  escitalopram 5 milliGRAM(s) Oral daily  polyethylene glycol 3350 17 Gram(s) Oral two times a day  senna 2 Tablet(s) Oral at bedtime    MEDICATIONS  (PRN):  acetaminophen     Tablet .. 650 milliGRAM(s) Oral every 6 hours PRN Temp greater or equal to 38C (100.4F), Mild Pain (1 - 3)    Vital Signs Last 24 Hrs  T(C): 35.6 (13 Jun 2023 05:13), Max: 35.6 (13 Jun 2023 05:13)  T(F): 96 (13 Jun 2023 05:13), Max: 96 (13 Jun 2023 05:13)  HR: 73 (13 Jun 2023 14:03) (73 - 87)  BP: 136/75 (13 Jun 2023 14:03) (129/70 - 136/75)  BP(mean): 93 (13 Jun 2023 05:13) (93 - 93)  RR: 20 (13 Jun 2023 14:03) (18 - 20)  SpO2: --      CAPILLARY BLOOD GLUCOSE                                  Chart, Consultant(s) Notes Reviewed:  [x ] YES  [ ] NO  Care Discussed with Consultants/Other Providers/ Housestaff [ x] YES  [ ] NO  Radiology, labs, old available records personally reviewed.                        ASSESSMENT and PLAN   89 years old F with PMHx of eczema and dementia presents to ED with daughter who is the HCP  reports that she brings pt today for NH placement   as it has become too difficult to care for pt at home. She also reports pt has been having brief episodes of LOC over the last few weeks that seemed to   be precipitated by agitation. The episodes are described as brief spells of unresponsiveness without seizure like activities and usually she recovers after   5-10 minutes and feels better afterwards. Pt is AAO x 1 at baseline    # syncope/ LOC  most likely 2/2 vaso vagal response with functional declining   #recurrent episodes after agitation/ periods of high emotion  reeg neg  ekg wnl   no events on tele  family (daughter) not interested in aggressive measures/ work up  RR on 6/8 for fall, mechanical vs. vasovagal. X-ray hip negative. Pt too agitated for CT exams. D/w daughter and no further imaging w/u needed  discharge planning to snf/ long term at [place].  - off tele  -pt removed IV because is confused. daughter requesting to not have IV replaced  - No need for daily labs.  - Monitor BM. Bowel regimen started  - orthostatics (-)    #Unable to care for self.   ·  Plan: pending placement to [place].    #Dementia.   c/w  lexapro 5  c/w xanax     DVT ppx: lovenox  DNR/DNI  Dispo: pending placement to [facility]      My note supersedes the residents note should a discrepancy arise.    Chart and notes personally reviewed.  Care Discussed with Consultants/Other Providers/ Housestaff [ x] YES [ ] NO   Radiology, labs, old records personally reviewed.    discussed w/ housestaff, nursing, case management, daughter, son        Time-based billing (NON-critical care).     40 minutes spent on total encounter. The necessity of the time spent during the encounter on this date of service was due to:     time spent on review of labs, imaging studies, old records, obtaining history, personally examining patient, counselling and communicating with patient/ family, entering orders for medications/tests/etc, discussions with other health care providers, documentation in electronic health records, independent interpretation of labs, imaging/procedure results and care coordination.

## 2023-06-13 NOTE — PROGRESS NOTE ADULT - SUBJECTIVE AND OBJECTIVE BOX
MARCOS TAYLOR 89y Female  MRN#: [id number]   Hospital Day: 8d    SUBJECTIVE  Patient is a 89y old Female who presents with a chief complaint of Placement (12 Jun 2023 11:30)  Currently admitted to medicine with the primary diagnosis of Brief loss of consciousness      INTERVAL HPI AND OVERNIGHT EVENTS:  Patient was examined and seen at bedside. This morning she is resting comfortably in bed and reports no issues or overnight events.    OBJECTIVE  PAST MEDICAL & SURGICAL HISTORY  Dementia    Eczema      ALLERGIES:  No Known Allergies    MEDICATIONS:  STANDING MEDICATIONS  ALPRAZolam 1 milliGRAM(s) Oral <User Schedule>  clobetasol 0.05% Cream 1 Application(s) Topical every 12 hours  enoxaparin Injectable 40 milliGRAM(s) SubCutaneous every 24 hours  escitalopram 5 milliGRAM(s) Oral daily  polyethylene glycol 3350 17 Gram(s) Oral two times a day  senna 2 Tablet(s) Oral at bedtime    PRN MEDICATIONS  acetaminophen     Tablet .. 650 milliGRAM(s) Oral every 6 hours PRN      VITAL SIGNS: Last 24 Hours  T(C): 35.6 (13 Jun 2023 05:13), Max: 35.6 (12 Jun 2023 12:30)  T(F): 96 (13 Jun 2023 05:13), Max: 96 (12 Jun 2023 12:30)  HR: 73 (13 Jun 2023 05:13) (73 - 87)  BP: 129/70 (13 Jun 2023 05:13) (119/56 - 133/61)  BP(mean): 93 (13 Jun 2023 05:13) (93 - 93)  RR: 18 (13 Jun 2023 05:13) (18 - 18)  SpO2: --    LABS:          PHYSICAL EXAM:  CONSTITUTIONAL: No acute distress, AAOx0   HEAD: Atraumatic, normocephalic  EYES: EOM intact, PERRLA, conjunctiva and sclera clear  ENT: moist mucous membranes  PULMONARY: Clear to auscultation bilaterally  CARDIOVASCULAR: Regular rate and rhythm  GASTROINTESTINAL: Soft, non-tender, non-distended; bowel sounds present  MUSCULOSKELETAL: no edema  NEUROLOGY: non-focal  SKIN: warm and dry      ASSESSMENT and PLAN   89 years old F with PMHx of eczema and dementia presents to ED with daughter who is the HCP  reports that she brings pt today for NH placement   as it has become too difficult to care for pt at home. She also reports pt has been having brief episodes of LOC over the last few weeks that seemed to   be precipitated by agitation. The episodes are described as brief spells of unresponsiveness without seizure like activities and usually she recovers after   5-10 minutes and feels better afterwards. Pt is AAO x 1 at baseline    # syncope/ LOC  most likely 2/2 vaso vagal response with functional declining   #recurrent episodes after agitation/ periods of high emotion  reeg neg  ekg wnl   family (daughter) not interested in aggressive measures/ work up  RR on 6/8 for fall, mechanical vs. vasovagal. X-ray hip negative. Pt too agitated for CT exams. D/w daughter and no further imaging w/u needed  discharge planning to snf/ long term at [place].  - dc tele  -pt removed IV because is confused. daughter requesting to not have IV replaced  - No need for daily labs.  - Monitor BM. Bowel regimen started  - orthostatics (-)    #Unable to care for self.   ·  Plan: pending placement to [place].    #Dementia.   c/w  lexapro 5  c/w xanax     DVT ppx: lovenox  DNR/DNI  Dispo: pending placement to [facility]

## 2023-06-13 NOTE — PROGRESS NOTE ADULT - REASON FOR ADMISSION
Placement

## 2023-06-13 NOTE — DISCHARGE NOTE NURSING/CASE MANAGEMENT/SOCIAL WORK - PATIENT PORTAL LINK FT
You can access the FollowMyHealth Patient Portal offered by [facility] by registering at the following website: [email or web address]. By joining [facility]’s FollowMyHealth portal, you will also be able to view your health information using other applications (apps) compatible with our system.

## 2023-06-18 DIAGNOSIS — E78.5 HYPERLIPIDEMIA, UNSPECIFIED: ICD-10-CM

## 2023-06-18 DIAGNOSIS — Z66 DO NOT RESUSCITATE: ICD-10-CM

## 2023-06-18 DIAGNOSIS — Z79.899 OTHER LONG TERM (CURRENT) DRUG THERAPY: ICD-10-CM

## 2023-06-18 DIAGNOSIS — F03.90 UNSPECIFIED DEMENTIA WITHOUT BEHAVIORAL DISTURBANCE: ICD-10-CM
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
## 2023-06-18 DIAGNOSIS — R55 SYNCOPE AND COLLAPSE: ICD-10-CM

## 2023-06-18 DIAGNOSIS — L30.9 DERMATITIS, UNSPECIFIED: ICD-10-CM
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
